# Patient Record
Sex: MALE | Race: WHITE | NOT HISPANIC OR LATINO | Employment: FULL TIME | URBAN - METROPOLITAN AREA
[De-identification: names, ages, dates, MRNs, and addresses within clinical notes are randomized per-mention and may not be internally consistent; named-entity substitution may affect disease eponyms.]

---

## 2017-02-15 ENCOUNTER — HOSPITAL ENCOUNTER (EMERGENCY)
Facility: HOSPITAL | Age: 37
Discharge: HOME | End: 2017-02-15
Admitting: EMERGENCY MEDICINE

## 2017-02-15 VITALS
WEIGHT: 168 LBS | TEMPERATURE: 97.3 F | SYSTOLIC BLOOD PRESSURE: 139 MMHG | OXYGEN SATURATION: 99 % | RESPIRATION RATE: 16 BRPM | DIASTOLIC BLOOD PRESSURE: 80 MMHG | HEART RATE: 66 BPM

## 2017-02-15 DIAGNOSIS — B34.9 ACUTE VIRAL SYNDROME: Primary | ICD-10-CM

## 2017-02-15 PROCEDURE — 99283 EMERGENCY DEPT VISIT LOW MDM: CPT

## 2017-02-15 RX ORDER — FLUTICASONE PROPIONATE 50 MCG
1 SPRAY, SUSPENSION (ML) NASAL DAILY
Qty: 1 BOTTLE | Refills: 0 | Status: SHIPPED | OUTPATIENT
Start: 2017-02-15 | End: 2017-12-27

## 2017-12-27 ENCOUNTER — HOSPITAL ENCOUNTER (EMERGENCY)
Facility: HOSPITAL | Age: 37
Discharge: HOME | End: 2017-12-27
Admitting: EMERGENCY MEDICINE
Payer: COMMERCIAL

## 2017-12-27 VITALS
RESPIRATION RATE: 15 BRPM | TEMPERATURE: 97.4 F | HEART RATE: 93 BPM | SYSTOLIC BLOOD PRESSURE: 135 MMHG | WEIGHT: 168 LBS | OXYGEN SATURATION: 100 % | DIASTOLIC BLOOD PRESSURE: 68 MMHG

## 2017-12-27 DIAGNOSIS — R19.7 DIARRHEA: Primary | ICD-10-CM

## 2017-12-27 LAB
ALBUMIN SERPL BCP-MCNC: 4 G/DL (ref 3.5–5)
ALP SERPL-CCNC: 64 U/L (ref 46–116)
ALT SERPL W P-5'-P-CCNC: 22 U/L (ref 12–78)
ANION GAP BLD CALC-SCNC: 14 MMOL/L (ref 4–13)
ANION GAP SERPL CALCULATED.3IONS-SCNC: 8 MMOL/L (ref 4–13)
AST SERPL W P-5'-P-CCNC: 17 U/L (ref 5–45)
BASOPHILS # BLD AUTO: 0.06 THOUSANDS/ΜL (ref 0–0.1)
BASOPHILS NFR BLD AUTO: 1 % (ref 0–1)
BILIRUB SERPL-MCNC: 0.37 MG/DL (ref 0.2–1)
BUN BLD-MCNC: 10 MG/DL (ref 5–25)
BUN SERPL-MCNC: 10 MG/DL (ref 5–25)
CA-I BLD-SCNC: 1.13 MMOL/L (ref 1.12–1.32)
CALCIUM SERPL-MCNC: 8.7 MG/DL (ref 8.3–10.1)
CHLORIDE BLD-SCNC: 102 MMOL/L (ref 100–108)
CHLORIDE SERPL-SCNC: 104 MMOL/L (ref 100–108)
CO2 SERPL-SCNC: 31 MMOL/L (ref 21–32)
CREAT BLD-MCNC: 0.8 MG/DL (ref 0.6–1.3)
CREAT SERPL-MCNC: 0.77 MG/DL (ref 0.6–1.3)
EOSINOPHIL # BLD AUTO: 0.13 THOUSAND/ΜL (ref 0–0.61)
EOSINOPHIL NFR BLD AUTO: 2 % (ref 0–6)
ERYTHROCYTE [DISTWIDTH] IN BLOOD BY AUTOMATED COUNT: 13.5 % (ref 11.6–15.1)
GFR SERPL CREATININE-BSD FRML MDRD: 115 ML/MIN/1.73SQ M
GFR SERPL CREATININE-BSD FRML MDRD: 117 ML/MIN/1.73SQ M
GLUCOSE SERPL-MCNC: 94 MG/DL (ref 65–140)
GLUCOSE SERPL-MCNC: 96 MG/DL (ref 65–140)
HCT VFR BLD AUTO: 41.3 % (ref 36.5–49.3)
HCT VFR BLD CALC: 41 % (ref 36.5–49.3)
HGB BLD-MCNC: 14.3 G/DL (ref 12–17)
HGB BLDA-MCNC: 13.9 G/DL (ref 12–17)
LIPASE SERPL-CCNC: 116 U/L (ref 73–393)
LYMPHOCYTES # BLD AUTO: 2.73 THOUSANDS/ΜL (ref 0.6–4.47)
LYMPHOCYTES NFR BLD AUTO: 33 % (ref 14–44)
MCH RBC QN AUTO: 31.5 PG (ref 26.8–34.3)
MCHC RBC AUTO-ENTMCNC: 34.6 G/DL (ref 31.4–37.4)
MCV RBC AUTO: 91 FL (ref 82–98)
MONOCYTES # BLD AUTO: 0.6 THOUSAND/ΜL (ref 0.17–1.22)
MONOCYTES NFR BLD AUTO: 7 % (ref 4–12)
NEUTROPHILS # BLD AUTO: 4.88 THOUSANDS/ΜL (ref 1.85–7.62)
NEUTS SEG NFR BLD AUTO: 57 % (ref 43–75)
NRBC BLD AUTO-RTO: 0 /100 WBCS
PCO2 BLD: 31 MMOL/L (ref 21–32)
PLATELET # BLD AUTO: 284 THOUSANDS/UL (ref 149–390)
PMV BLD AUTO: 9.5 FL (ref 8.9–12.7)
POTASSIUM BLD-SCNC: 3.7 MMOL/L (ref 3.5–5.3)
POTASSIUM SERPL-SCNC: 3.7 MMOL/L (ref 3.5–5.3)
PROT SERPL-MCNC: 7.2 G/DL (ref 6.4–8.2)
RBC # BLD AUTO: 4.54 MILLION/UL (ref 3.88–5.62)
SODIUM BLD-SCNC: 142 MMOL/L (ref 136–145)
SODIUM SERPL-SCNC: 143 MMOL/L (ref 136–145)
SPECIMEN SOURCE: ABNORMAL
WBC # BLD AUTO: 8.4 THOUSAND/UL (ref 4.31–10.16)

## 2017-12-27 PROCEDURE — 85014 HEMATOCRIT: CPT

## 2017-12-27 PROCEDURE — 36415 COLL VENOUS BLD VENIPUNCTURE: CPT | Performed by: PHYSICIAN ASSISTANT

## 2017-12-27 PROCEDURE — 80047 BASIC METABLC PNL IONIZED CA: CPT

## 2017-12-27 PROCEDURE — 83690 ASSAY OF LIPASE: CPT | Performed by: PHYSICIAN ASSISTANT

## 2017-12-27 PROCEDURE — 99284 EMERGENCY DEPT VISIT MOD MDM: CPT

## 2017-12-27 PROCEDURE — 85025 COMPLETE CBC W/AUTO DIFF WBC: CPT | Performed by: PHYSICIAN ASSISTANT

## 2017-12-27 PROCEDURE — 96360 HYDRATION IV INFUSION INIT: CPT

## 2017-12-27 PROCEDURE — 80053 COMPREHEN METABOLIC PANEL: CPT | Performed by: PHYSICIAN ASSISTANT

## 2017-12-27 PROCEDURE — 86617 LYME DISEASE ANTIBODY: CPT | Performed by: PHYSICIAN ASSISTANT

## 2017-12-27 RX ADMIN — SODIUM CHLORIDE 1000 ML: 0.9 INJECTION, SOLUTION INTRAVENOUS at 20:01

## 2017-12-28 NOTE — ED PROVIDER NOTES
History  Chief Complaint   Patient presents with    Diarrhea     patient reports recent gastroenteritis for approx 2-3 weeks  reports improvement for one week  reports diarrhea, nausea, and abdominal cramping started again last night  71-year-old male presents to the emergency department for persistent diarrhea symptoms  Patient has been seen and evaluated at [facility] x2 for similar symptoms  Patient denies any significant workup while at [facility]  Patient in no acute distress non toxic  At this time will line lab will try to attempt a stool sample while in the department  Patient agreeable  History provided by:  Patient   used: No    GI Problem   Location:  Abd discomfort   Quality:  Bloating recurrent diarrhea  Severity:  Moderate  Onset quality:  Gradual  Timing:  Intermittent  Progression:  Waxing and waning  Chronicity:  New  Relieved by:  Gatorate helps  Associated symptoms: abdominal pain, diarrhea, fatigue, headaches and nausea    Associated symptoms: no chest pain, no congestion, no cough, no ear pain, no fever, no loss of consciousness, no myalgias, no rash, no rhinorrhea, no shortness of breath, no sore throat and no vomiting    Associated symptoms comment:  ABD discomfort  Abdominal pain:     Location:  Generalized    Quality: aching and bloating    Diarrhea:     Quality:  Watery, copious and malodorous    Severity:  Moderate    Duration:  3 weeks    Timing:  Intermittent    Progression:  Partially resolved      None       History reviewed  No pertinent past medical history  History reviewed  No pertinent surgical history  History reviewed  No pertinent family history  I have reviewed and agree with the history as documented      Social History   Substance Use Topics    Smoking status: Current Every Day Smoker    Smokeless tobacco: Never Used    Alcohol use Yes      Comment: occassionally        Review of Systems   Constitutional: Positive for fatigue  Negative for fever  HENT: Negative for congestion, ear pain, rhinorrhea and sore throat  Respiratory: Negative for cough and shortness of breath  Cardiovascular: Negative for chest pain  Gastrointestinal: Positive for abdominal pain, diarrhea and nausea  Negative for vomiting  Musculoskeletal: Negative for myalgias  Skin: Negative for rash  Neurological: Positive for headaches  Negative for loss of consciousness  Physical Exam  ED Triage Vitals [12/27/17 1925]   Temperature Pulse Respirations Blood Pressure SpO2   (!) 97 4 °F (36 3 °C) 93 15 135/68 100 %      Temp Source Heart Rate Source Patient Position - Orthostatic VS BP Location FiO2 (%)   Oral -- Sitting Right arm --      Pain Score       --           Orthostatic Vital Signs  Vitals:    12/27/17 1925   BP: 135/68   Pulse: 93   Patient Position - Orthostatic VS: Sitting       Physical Exam   Constitutional: He is oriented to person, place, and time  He appears well-developed and well-nourished  No distress  HENT:   Head: Normocephalic  Right Ear: External ear normal    Left Ear: External ear normal    Nose: Nose normal    Mouth/Throat: Oropharynx is clear and moist  No oropharyngeal exudate  Eyes: Conjunctivae are normal    Neck: Neck supple  No JVD present  Cardiovascular: Normal rate and regular rhythm  Pulmonary/Chest: Effort normal    Abdominal: Soft  Bowel sounds are normal    Genitourinary:   Genitourinary Comments: No complaints deferred   Musculoskeletal: Normal range of motion  He exhibits no edema or deformity  Lymphadenopathy:     He has no cervical adenopathy  Neurological: He is alert and oriented to person, place, and time  Skin: Skin is warm and dry  Capillary refill takes less than 2 seconds  He is not diaphoretic         ED Medications  Medications   sodium chloride 0 9 % bolus 1,000 mL (0 mL Intravenous Stopped 12/27/17 2056)       Diagnostic Studies  Results Reviewed     Procedure Component Value Units Date/Time    Lyme disease, western blot [[id number]] Collected:  12/27/17 2114    Lab Status: In process Specimen:  Blood from Arm, Left Updated:  12/27/17 2117    Comprehensive metabolic panel [[id number]] Collected:  12/27/17 2000    Lab Status:  Final result Specimen:  Blood from Arm, Left Updated:  12/27/17 2021     Sodium 143 mmol/L      Potassium 3 7 mmol/L      Chloride 104 mmol/L      CO2 31 mmol/L      Anion Gap 8 mmol/L      BUN 10 mg/dL      Creatinine 0 77 mg/dL      Glucose 96 mg/dL      Calcium 8 7 mg/dL      AST 17 U/L      ALT 22 U/L      Alkaline Phosphatase 64 U/L      Total Protein 7 2 g/dL      Albumin 4 0 g/dL      Total Bilirubin 0 37 mg/dL      eGFR 117 ml/min/1 73sq m     Narrative:         National Kidney Disease Education Program recommendations are as follows:  GFR calculation is accurate only with a steady state creatinine  Chronic Kidney disease less than 60 ml/min/1 73 sq  meters  Kidney failure less than 15 ml/min/1 73 sq  meters      Lipase [[id number]]  (Normal) Collected:  12/27/17 2000    Lab Status:  Final result Specimen:  Blood from Arm, Left Updated:  12/27/17 2021     Lipase 116 u/L     POCT Chem 8+ [[id number]]  (Abnormal) Collected:  12/27/17 2006    Lab Status:  Final result Updated:  12/27/17 2011     SODIUM, I-STAT 142 mmol/l      Potassium, i-STAT 3 7 mmol/L      Chloride, istat 102 mmol/L      CO2, i-STAT 31 mmol/L      Anion Gap, Istat 14 (H) mmol/L      Calcium, Ionized i-STAT 1 13 mmol/L      BUN, I-STAT 10 mg/dl      Creatinine, i-STAT 0 8 mg/dl      eGFR 115 ml/min/1 73sq m      Glucose, i-STAT 94 mg/dl      Hct, i-STAT 41 %      Hgb, i-STAT 13 9 g/dl      Specimen Type VENOUS    CBC and differential [[id number]]  (Normal) Collected:  12/27/17 2000    Lab Status:  Final result Specimen:  Blood from Arm, Left Updated:  12/27/17 2007     WBC 8 40 Thousand/uL      RBC 4 54 Million/uL      Hemoglobin 14 3 g/dL      Hematocrit 41 3 %      MCV 91 fL      MCH 31 5 pg MCHC 34 6 g/dL      RDW 13 5 %      MPV 9 5 fL      Platelets 472 Thousands/uL      nRBC 0 /100 WBCs      Neutrophils Relative 57 %      Lymphocytes Relative 33 %      Monocytes Relative 7 %      Eosinophils Relative 2 %      Basophils Relative 1 %      Neutrophils Absolute 4 88 Thousands/µL      Lymphocytes Absolute 2 73 Thousands/µL      Monocytes Absolute 0 60 Thousand/µL      Eosinophils Absolute 0 13 Thousand/µL      Basophils Absolute 0 06 Thousands/µL     Stool Enteric Bacterial Panel by PCR [[id number]]     Lab Status:  No result Specimen:  Stool from Per Rectum     Clostridium difficile toxin by PCR [[id number]]     Lab Status:  No result Specimen:  Stool from Per Rectum                  No orders to display              Procedures  Procedures       Phone Contacts  ED Phone Contact    ED Course  ED Course                                MDM  Number of Diagnoses or Management Options  Diarrhea:   Diagnosis management comments: Labs reviewed  Patient unable to give a stool sample in the department  Patient nontoxic  Patient felt stable for discharge to home with prescriptions for outpatient stool sample evaluation  Educated patient of diagnosis and home management  Encourage patient to hydrate well  Encourage patient to obtain his stool laboratory test   Educated patient on persistent or worsening signs symptoms and to follow up with primary care and/or return to the emergency department  Patient admits understanding and agreement         Amount and/or Complexity of Data Reviewed  Clinical lab tests: ordered and reviewed      CritCare Time    Disposition  Final diagnoses:   Diarrhea     Time reflects when diagnosis was documented in both MDM as applicable and the Disposition within this note     Time User Action Codes Description Comment    12/27/2017  9:24 PM Kimberly Aranda Add [R19 7] Diarrhea       ED Disposition     ED Disposition Condition Comment    Discharge  [facility] discharge to home/self care     Condition at discharge: Stable        Follow-up Information    None       There are no discharge medications for this patient  Outpatient Discharge Orders  Stool Enteric Bacterial Panel by PCR   Standing Status: Future  Standing Exp  Date: 12/27/18     Aeromonas/Pleisiomonas Stool Culture   Standing Status: Future  Standing Exp  Date: 12/27/18     Clostridium difficile toxin by PCR   Standing Status: Future  Standing Exp   Date: 12/27/18         ED Provider  Electronically Signed by           Selvin Curtis PA-C  12/28/17 9244

## 2017-12-28 NOTE — DISCHARGE INSTRUCTIONS
Acute Diarrhea   WHAT YOU NEED TO KNOW:   Acute diarrhea starts quickly and lasts a short time, usually 1 to 3 days  It can last up to 2 weeks  You may not be able to control your diarrhea  Acute diarrhea usually stops on its own  DISCHARGE INSTRUCTIONS:   Return to the emergency department if:   · You feel confused  · Your heartbeat is faster than normal      · Your eyes look deeply sunken, or you have no tears when you cry  · You urinate less than usual, or your urine is dark yellow  · You have blood or mucus in your stools  · You have severe abdominal pain  · You are unable to drink any liquids  Contact your healthcare provider if:   · Your symptoms do not get better with treatment  · You have a fever higher than 101 3°F (38 5°C)  · You have trouble eating and drinking because you are vomiting  · You are thirsty or have a dry mouth  · Your diarrhea does not get better in 7 days  · You have questions or concerns about your condition or care  Follow up with your healthcare provider as directed:  Write down your questions so you remember to ask them during your visits  Medicines:  · Diarrhea medicine  is an over-the-counter medicine that helps slow or stop your diarrhea  If you take other medicines, talk to your healthcare provider before you take diarrhea medicine  · Antibiotics  may be given to help treat an infection caused by bacteria  · Antiparasitics  may be given to treat an infection caused by parasites  · Take your medicine as directed  Contact your healthcare provider if you think your medicine is not helping or if you have side effects  Tell him of her if you are allergic to any medicine  Keep a list of the medicines, vitamins, and herbs you take  Include the amounts, and when and why you take them  Bring the list or the pill bottles to follow-up visits  Carry your medicine list with you in case of an emergency    Self-care:   · Drink liquids as directed  Liquids will help prevent dehydration caused by diarrhea  Ask your healthcare provider how much liquid to drink each day and which liquids are best for you  You may need to drink an oral rehydration solution (ORS)  An ORS has the right amounts of water, salts, and sugar you need to replace body fluids  You can buy an ORS at most grocery stores and pharmacies  · Eat foods that are easy to digest   Examples include rice, lentils, cereal, bananas, potatoes, and bread  It also includes some fruits (bananas, melon), well-cooked vegetables, and lean meats  Avoid foods high in fiber, fat, and sugar  Also avoid caffeine, alcohol, dairy, and red meat until your diarrhea is gone  Prevent acute diarrhea:   · Wash your hands often  Use soap and water  Wash your hands before you eat or prepare food  Also wash your hands after you use the bathroom  Use an alcohol-based hand gel when soap and water are not available  · Keep bathroom surfaces clean  This helps prevent the spread of germs that cause acute diarrhea  · Wash fruits and vegetables well before you eat them  This can help remove germs that cause diarrhea  If possible, remove the skin from fruits and vegetables, or cook them well before you eat them  · Cook meat as directed  ¨ Cook ground meat  to 160°F      ¨ Cook ground poultry, whole poultry, or cuts of poultry  to at least 165°F  Remove the meat from heat  Let it stand for 3 minutes before you eat it  ¨ Cook whole cuts of meat other than poultry  to at least 145°F  Remove the meat from heat  Let it stand for 3 minutes before you eat it  · Wash dishes that have touched raw meat with hot water and soap  This includes cutting boards, utensils, dishes, and serving containers  · Place raw or cooked meat in the refrigerator as soon as possible  Bacteria can grow in meat that is left at room temperature too long  · Do not eat raw or undercooked oysters, clams, or mussels  These foods may be contaminated and cause infection  · Drink filtered or treated water only when you travel  Do not put ice in your drinks  Drink bottled water whenever possible  © 2017 [employer] Information is for End User's use only and may not be sold, redistributed or otherwise used for commercial purposes  All illustrations and images included in CareNotes® are the copyrighted property of A [employer]  or Stanley Donohue  The above information is an  only  It is not intended as medical advice for individual conditions or treatments  Talk to your doctor, nurse or pharmacist before following any medical regimen to see if it is safe and effective for you

## 2017-12-29 LAB
B BURGDOR IGG PATRN SER IB-IMP: NEGATIVE
B BURGDOR IGM PATRN SER IB-IMP: NEGATIVE
B BURGDOR18KD IGG SER QL IB: ABNORMAL
B BURGDOR23KD IGG SER QL IB: ABNORMAL
B BURGDOR23KD IGM SER QL IB: PRESENT
B BURGDOR28KD IGG SER QL IB: ABNORMAL
B BURGDOR30KD IGG SER QL IB: ABNORMAL
B BURGDOR39KD IGG SER QL IB: ABNORMAL
B BURGDOR39KD IGM SER QL IB: ABNORMAL
B BURGDOR41KD IGG SER QL IB: ABNORMAL
B BURGDOR41KD IGM SER QL IB: ABNORMAL
B BURGDOR45KD IGG SER QL IB: ABNORMAL
B BURGDOR58KD IGG SER QL IB: ABNORMAL
B BURGDOR66KD IGG SER QL IB: ABNORMAL
B BURGDOR93KD IGG SER QL IB: ABNORMAL

## 2018-02-26 ENCOUNTER — HOSPITAL ENCOUNTER (EMERGENCY)
Facility: HOSPITAL | Age: 38
Discharge: HOME | End: 2018-02-26
Attending: EMERGENCY MEDICINE | Admitting: EMERGENCY MEDICINE
Payer: COMMERCIAL

## 2018-02-26 VITALS
TEMPERATURE: 97.8 F | OXYGEN SATURATION: 99 % | RESPIRATION RATE: 16 BRPM | HEART RATE: 61 BPM | SYSTOLIC BLOOD PRESSURE: 122 MMHG | DIASTOLIC BLOOD PRESSURE: 70 MMHG | WEIGHT: 171 LBS

## 2018-02-26 DIAGNOSIS — E86.0 DEHYDRATION: ICD-10-CM

## 2018-02-26 DIAGNOSIS — R10.9 ABDOMINAL PAIN: ICD-10-CM

## 2018-02-26 DIAGNOSIS — R19.7 ACUTE DIARRHEA: Primary | ICD-10-CM

## 2018-02-26 LAB
ALBUMIN SERPL BCP-MCNC: 4.6 G/DL (ref 3.5–5)
ALP SERPL-CCNC: 73 U/L (ref 46–116)
ALT SERPL W P-5'-P-CCNC: 25 U/L (ref 12–78)
ANION GAP SERPL CALCULATED.3IONS-SCNC: 8 MMOL/L (ref 4–13)
AST SERPL W P-5'-P-CCNC: 27 U/L (ref 5–45)
BACTERIA UR QL AUTO: ABNORMAL /HPF
BASOPHILS # BLD AUTO: 0.05 THOUSANDS/ΜL (ref 0–0.1)
BASOPHILS NFR BLD AUTO: 1 % (ref 0–1)
BILIRUB SERPL-MCNC: 0.63 MG/DL (ref 0.2–1)
BILIRUB UR QL STRIP: ABNORMAL
BUN SERPL-MCNC: 13 MG/DL (ref 5–25)
CALCIUM SERPL-MCNC: 9.4 MG/DL (ref 8.3–10.1)
CHLORIDE SERPL-SCNC: 104 MMOL/L (ref 100–108)
CLARITY UR: CLEAR
CO2 SERPL-SCNC: 28 MMOL/L (ref 21–32)
COLOR UR: YELLOW
CREAT SERPL-MCNC: 0.68 MG/DL (ref 0.6–1.3)
EOSINOPHIL # BLD AUTO: 0.08 THOUSAND/ΜL (ref 0–0.61)
EOSINOPHIL NFR BLD AUTO: 1 % (ref 0–6)
ERYTHROCYTE [DISTWIDTH] IN BLOOD BY AUTOMATED COUNT: 13.5 % (ref 11.6–15.1)
GFR SERPL CREATININE-BSD FRML MDRD: 123 ML/MIN/1.73SQ M
GLUCOSE SERPL-MCNC: 94 MG/DL (ref 65–140)
GLUCOSE UR STRIP-MCNC: NEGATIVE MG/DL
HCT VFR BLD AUTO: 43.3 % (ref 36.5–49.3)
HGB BLD-MCNC: 15 G/DL (ref 12–17)
HGB UR QL STRIP.AUTO: ABNORMAL
KETONES UR STRIP-MCNC: NEGATIVE MG/DL
LEUKOCYTE ESTERASE UR QL STRIP: NEGATIVE
LIPASE SERPL-CCNC: 71 U/L (ref 73–393)
LYMPHOCYTES # BLD AUTO: 2.17 THOUSANDS/ΜL (ref 0.6–4.47)
LYMPHOCYTES NFR BLD AUTO: 25 % (ref 14–44)
MCH RBC QN AUTO: 30.4 PG (ref 26.8–34.3)
MCHC RBC AUTO-ENTMCNC: 34.6 G/DL (ref 31.4–37.4)
MCV RBC AUTO: 88 FL (ref 82–98)
MONOCYTES # BLD AUTO: 0.83 THOUSAND/ΜL (ref 0.17–1.22)
MONOCYTES NFR BLD AUTO: 9 % (ref 4–12)
MUCOUS THREADS UR QL AUTO: ABNORMAL
NEUTROPHILS # BLD AUTO: 5.69 THOUSANDS/ΜL (ref 1.85–7.62)
NEUTS SEG NFR BLD AUTO: 64 % (ref 43–75)
NITRITE UR QL STRIP: NEGATIVE
NON-SQ EPI CELLS URNS QL MICRO: ABNORMAL /HPF
PH UR STRIP.AUTO: 6 [PH] (ref 4.5–8)
PLATELET # BLD AUTO: 317 THOUSANDS/UL (ref 149–390)
PMV BLD AUTO: 9.1 FL (ref 8.9–12.7)
POTASSIUM SERPL-SCNC: 4.5 MMOL/L (ref 3.5–5.3)
PROT SERPL-MCNC: 8.2 G/DL (ref 6.4–8.2)
PROT UR STRIP-MCNC: ABNORMAL MG/DL
RBC # BLD AUTO: 4.93 MILLION/UL (ref 3.88–5.62)
RBC #/AREA URNS AUTO: ABNORMAL /HPF
SODIUM SERPL-SCNC: 140 MMOL/L (ref 136–145)
SP GR UR STRIP.AUTO: 1.02 (ref 1–1.03)
UROBILINOGEN UR QL STRIP.AUTO: 0.2 E.U./DL
WBC # BLD AUTO: 8.82 THOUSAND/UL (ref 4.31–10.16)
WBC #/AREA URNS AUTO: ABNORMAL /HPF

## 2018-02-26 PROCEDURE — 99284 EMERGENCY DEPT VISIT MOD MDM: CPT

## 2018-02-26 PROCEDURE — 36415 COLL VENOUS BLD VENIPUNCTURE: CPT

## 2018-02-26 PROCEDURE — 83690 ASSAY OF LIPASE: CPT | Performed by: EMERGENCY MEDICINE

## 2018-02-26 PROCEDURE — 80053 COMPREHEN METABOLIC PANEL: CPT | Performed by: EMERGENCY MEDICINE

## 2018-02-26 PROCEDURE — 81002 URINALYSIS NONAUTO W/O SCOPE: CPT | Performed by: EMERGENCY MEDICINE

## 2018-02-26 PROCEDURE — 85025 COMPLETE CBC W/AUTO DIFF WBC: CPT | Performed by: EMERGENCY MEDICINE

## 2018-02-26 PROCEDURE — 81001 URINALYSIS AUTO W/SCOPE: CPT

## 2018-02-26 RX ORDER — SODIUM CHLORIDE 9 MG/ML
1000 INJECTION, SOLUTION INTRAVENOUS ONCE
Status: COMPLETED | OUTPATIENT
Start: 2018-02-26 | End: 2018-02-26

## 2018-02-26 RX ORDER — DICYCLOMINE HCL 20 MG
20 TABLET ORAL 2 TIMES DAILY
Qty: 20 TABLET | Refills: 0 | Status: SHIPPED | OUTPATIENT
Start: 2018-02-26 | End: 2018-03-10

## 2018-02-26 RX ADMIN — SODIUM CHLORIDE 1000 ML/HR: 0.9 INJECTION, SOLUTION INTRAVENOUS at 12:10

## 2018-02-26 NOTE — ED PROVIDER NOTES
History  Chief Complaint   Patient presents with    Diarrhea     Pt reports a dozen episodes of diarrhea over night  pt states he has chills but denies abdominal pain  Pt also complains of generalized weakness  Pt deneis fevers but is positive for nausea  Pt denies cp/sob  History provided by:  Patient  Diarrhea   Quality:  Semi-solid  Severity:  Unable to specify  Onset quality:  Gradual  Duration:  1 day  Timing:  Constant  Progression:  Unchanged  Relieved by:  Nothing  Worsened by:  Nothing  Ineffective treatments:  None tried  Associated symptoms: abdominal pain and chills    Associated symptoms: no arthralgias, no recent cough, no diaphoresis, no fever, no headaches, no myalgias, no URI and no vomiting    Abdominal pain:     Location:  Generalized    Quality: aching      Severity:  Mild    Onset quality:  Gradual    Duration:  1 day    Timing:  Constant  Risk factors: no recent antibiotic use and no travel to endemic areas        None       Past Medical History:   Diagnosis Date    No known problems        History reviewed  No pertinent surgical history  History reviewed  No pertinent family history  I have reviewed and agree with the history as documented  Social History   Substance Use Topics    Smoking status: Current Every Day Smoker    Smokeless tobacco: Never Used    Alcohol use Yes      Comment: occassionally        Review of Systems   Constitutional: Positive for chills  Negative for activity change, appetite change, diaphoresis, fatigue and fever  HENT: Negative for congestion, dental problem, ear pain, rhinorrhea and sore throat  Eyes: Negative for pain and redness  Respiratory: Negative for chest tightness, shortness of breath and wheezing  Cardiovascular: Negative for chest pain and palpitations  Gastrointestinal: Positive for abdominal pain and diarrhea  Negative for blood in stool, constipation, nausea and vomiting     Endocrine: Negative for cold intolerance and heat intolerance  Genitourinary: Negative for dysuria, frequency and hematuria  Musculoskeletal: Negative for arthralgias and myalgias  Skin: Negative for color change, pallor and rash  Neurological: Negative for weakness, numbness and headaches  Hematological: Does not bruise/bleed easily  Psychiatric/Behavioral: Negative for agitation, hallucinations and suicidal ideas  Physical Exam  ED Triage Vitals   Temperature Pulse Respirations Blood Pressure SpO2   02/26/18 1053 02/26/18 1052 02/26/18 1052 02/26/18 1052 02/26/18 1052   98 °F (36 7 °C) 80 16 118/70 98 %      Temp Source Heart Rate Source Patient Position - Orthostatic VS BP Location FiO2 (%)   02/26/18 1053 02/26/18 1052 02/26/18 1052 02/26/18 1052 --   Oral Monitor Sitting Right arm       Pain Score       02/26/18 1052       No Pain           Orthostatic Vital Signs  Vitals:    02/26/18 1052   BP: 118/70   Pulse: 80   Patient Position - Orthostatic VS: Sitting       Physical Exam   Constitutional: He is oriented to person, place, and time  He appears well-developed and well-nourished  HENT:   Mouth/Throat: No oropharyngeal exudate  TMs normal bilaterally no pharyngeal erythema no rhinorrhea nontender palpation of sinuses, normal looking turbinates   Eyes: Conjunctivae and EOM are normal    Neck: Normal range of motion  Neck supple  No meningeal signs   Cardiovascular: Normal rate, regular rhythm, normal heart sounds and intact distal pulses  Pulmonary/Chest: Effort normal and breath sounds normal  No respiratory distress  He has no wheezes  He has no rales  He exhibits no tenderness  Abdominal: Soft  Bowel sounds are normal  He exhibits no distension and no mass  There is no tenderness  No hernia  No cvat   Musculoskeletal: Normal range of motion  He exhibits no edema  Lymphadenopathy:     He has no cervical adenopathy  Neurological: He is alert and oriented to person, place, and time  No cranial nerve deficit     Skin: No rash noted  No erythema  No edema   Psychiatric: He has a normal mood and affect  His behavior is normal    Nursing note and vitals reviewed  ED Medications  Medications   sodium chloride 0 9 % infusion (1,000 mL/hr Intravenous New Bag 2/26/18 1210)       Diagnostic Studies  Results Reviewed     Procedure Component Value Units Date/Time    Urine Microscopic [[id number]]  (Abnormal) Collected:  02/26/18 1147    Lab Status:  Final result Specimen:  Urine from Urine, Clean Catch Updated:  02/26/18 1224     RBC, UA 2-4 (A) /hpf      WBC, UA None Seen /hpf      Epithelial Cells Occasional /hpf      Bacteria, UA None Seen /hpf      MUCOUS THREADS Occasional    Comprehensive metabolic panel [[id number]] Collected:  02/26/18 1143    Lab Status:  Final result Specimen:  Blood from Arm, Right Updated:  02/26/18 1203     Sodium 140 mmol/L      Potassium 4 5 mmol/L      Chloride 104 mmol/L      CO2 28 mmol/L      Anion Gap 8 mmol/L      BUN 13 mg/dL      Creatinine 0 68 mg/dL      Glucose 94 mg/dL      Calcium 9 4 mg/dL      AST 27 U/L      ALT 25 U/L      Alkaline Phosphatase 73 U/L      Total Protein 8 2 g/dL      Albumin 4 6 g/dL      Total Bilirubin 0 63 mg/dL      eGFR 123 ml/min/1 73sq m     Narrative:         National Kidney Disease Education Program recommendations are as follows:  GFR calculation is accurate only with a steady state creatinine  Chronic Kidney disease less than 60 ml/min/1 73 sq  meters  Kidney failure less than 15 ml/min/1 73 sq  meters      Lipase [[id number]]  (Abnormal) Collected:  02/26/18 1143    Lab Status:  Final result Specimen:  Blood from Arm, Right Updated:  02/26/18 1203     Lipase 71 (L) u/L     CBC and differential [[id number]]  (Normal) Collected:  02/26/18 1143    Lab Status:  Final result Specimen:  Blood from Arm, Right Updated:  02/26/18 1153     WBC 8 82 Thousand/uL      RBC 4 93 Million/uL      Hemoglobin 15 0 g/dL      Hematocrit 43 3 %      MCV 88 fL      MCH 30 4 pg      MCHC 34 6 g/dL      RDW 13 5 %      MPV 9 1 fL      Platelets 013 Thousands/uL      Neutrophils Relative 64 %      Lymphocytes Relative 25 %      Monocytes Relative 9 %      Eosinophils Relative 1 %      Basophils Relative 1 %      Neutrophils Absolute 5 69 Thousands/µL      Lymphocytes Absolute 2 17 Thousands/µL      Monocytes Absolute 0 83 Thousand/µL      Eosinophils Absolute 0 08 Thousand/µL      Basophils Absolute 0 05 Thousands/µL     POCT urinalysis dipstick [[id number]]  (Abnormal) Resulted:  02/26/18 1147    Lab Status:  Final result Updated:  02/26/18 1147    ED Urine Macroscopic [[id number]]  (Abnormal) Collected:  02/26/18 1147    Lab Status:  Final result Specimen:  Urine Updated:  02/26/18 1146     Color, UA Yellow     Clarity, UA Clear     pH, UA 6 0     Leukocytes, UA Negative     Nitrite, UA Negative     Protein, UA Trace (A) mg/dl      Glucose, UA Negative mg/dl      Ketones, UA Negative mg/dl      Urobilinogen, UA 0 2 E U /dl      Bilirubin, UA Interference- unable to analyze (A)     Blood, UA Trace (A)     Specific [place]ion, UA 1 020    Narrative:       CLINITEK RESULT                 No orders to display              Procedures  Procedures       Phone Contacts  ED Phone Contact    ED Course  ED Course as of Feb 26 1227   Mon Feb 26, 2018   1219 Work up reviewed and benign  Will dc to home with symptomatic tx                                  MDM  Number of Diagnoses or Management Options  Abdominal pain:   Acute diarrhea:   Dehydration:   Diagnosis management comments: abd pain,diarrhea, benign exam-will check labs, ivfs, tx symptoms    CritCare Time    Disposition  Final diagnoses:   Acute diarrhea   Abdominal pain   Dehydration     Time reflects when diagnosis was documented in both MDM as applicable and the Disposition within this note     Time User Action Codes Description Comment    2/26/2018 12:22 PM Savi Ross Add [R19 7] Acute diarrhea     2/26/2018 12:22 PM Miranda Fu Add [R10 9] Abdominal pain 2/26/2018 12:22 PM Vikash Cagle Add [E86 0] Dehydration       ED Disposition     ED Disposition Condition Comment    Discharge  Hermelindo Olson discharge to home/self care  Condition at discharge: Good        Follow-up Information     Follow up With Specialties Details Why Contact Info    [facility]  Schedule an appointment as soon as possible for a visit in 2 days  [phone]          Patient's Medications   Discharge Prescriptions    DICYCLOMINE (BENTYL) 20 MG TABLET    Take 1 tablet (20 mg total) by mouth 2 (two) times a day for 7 days       Start Date: 2/26/2018 End Date: 3/5/2018       Order Dose: 20 mg       Quantity: 20 tablet    Refills: 0     No discharge procedures on file      ED Provider  Electronically Signed by           Jenny Escalante MD  02/26/18 4660

## 2018-02-26 NOTE — DISCHARGE INSTRUCTIONS
Acute Abdominal Pain   WHAT YOU NEED TO KNOW:   The cause of your abdominal pain may not be found  If a cause is found, treatment will depend on what the cause is  DISCHARGE INSTRUCTIONS:   Return to the emergency department if:   · You vomit blood or cannot stop vomiting  · You have blood in your bowel movement or it looks like tar  · You have bleeding from your rectum  · Your abdomen is larger than usual, more painful, and hard  · You have severe pain in your abdomen  · You stop passing gas and having bowel movements  · You feel weak, dizzy, or faint  Contact your healthcare provider if:   · You have a fever  · You have new signs and symptoms  · Your symptoms do not get better with treatment  · You have questions or concerns about your condition or care  Medicines  may be given to decrease pain, treat an infection, and manage your symptoms  Take your medicine as directed  Call your healthcare provider if you think your medicine is not helping or if you have side effects  Tell him if you are allergic to any medicine  Keep a list of the medicines, vitamins, and herbs you take  Include the amounts, and when and why you take them  Bring the list or the pill bottles to follow-up visits  Carry your medicine list with you in case of an emergency  Manage your symptoms:   · Apply heat  on your abdomen for 20 to 30 minutes every 2 hours for as many days as directed  Heat helps decrease pain and muscle spasms  · Manage your stress  Stress may cause abdominal pain  Your healthcare provider may recommend relaxation techniques and deep breathing exercises to help decrease your stress  Your healthcare provider may recommend you talk to someone about your stress or anxiety, such as a counselor or a trusted friend  Get plenty of sleep and exercise regularly  · Limit or do not drink alcohol  Alcohol can make your abdominal pain worse   Ask your healthcare provider if it is safe for you to drink alcohol  Also ask how much is safe for you to drink  · Do not smoke  Nicotine and other chemicals in cigarettes can damage your esophagus and stomach  Ask your healthcare provider for information if you currently smoke and need help to quit  E-cigarettes or smokeless tobacco still contain nicotine  Talk to your healthcare provider before you use these products  Make changes to the food you eat as directed:  Do not eat foods that cause abdominal pain or other symptoms  Eat small meals more often  · Eat more high-fiber foods if you are constipated  High-fiber foods include fruits, vegetables, whole-grain foods, and legumes  · Do not eat foods that cause gas if you have bloating  Examples include broccoli, cabbage, and cauliflower  Do not drink soda or carbonated drinks, because these may also cause gas  · Do not eat foods or drinks that contain sorbitol or fructose if you have diarrhea and bloating  Some examples are fruit juices, candy, jelly, and sugar-free gum  · Do not eat high-fat foods, such as fried foods, cheeseburgers, hot dogs, and desserts  · Limit or do not drink caffeine  Caffeine may make symptoms, such as heart burn or nausea, worse  · Drink plenty of liquids to prevent dehydration from diarrhea or vomiting  Ask your healthcare provider how much liquid to drink each day and which liquids are best for you  Follow up with your healthcare provider as directed:  Write down your questions so you remember to ask them during your visits  © 2017 [employer] Information is for End User's use only and may not be sold, redistributed or otherwise used for commercial purposes  All illustrations and images included in CareNotes® are the copyrighted property of [employer]  or Stanley Donohue  The above information is an  only  It is not intended as medical advice for individual conditions or treatments   Talk to your doctor, nurse or pharmacist before following any medical regimen to see if it is safe and effective for you  Acute Diarrhea   AMBULATORY CARE:   Acute diarrhea  starts quickly and lasts a short time, usually 1 to 3 days  It can last up to 2 weeks  Signs and symptoms that may happen with diarrhea:  You may have 3 or more episodes of diarrhea  It may be hard to control your diarrhea  You may also have any of the following:  · Fever and chills    · Headache or abdominal pain    · Nausea and vomiting    · Symptoms of dehydration such as thirst, decreased urination, dry skin, sunken eyes, or fast, pounding heartbeat  Seek care immediately if:   · You feel confused  · Your heartbeat is faster than normal      · Your eyes look deeply sunken, or you have no tears when you cry  · You urinate less than usual, or your urine is dark yellow  · You have blood or mucus in your stools  · You have severe abdominal pain  · You are unable to drink any liquids  Contact your healthcare provider if:  · Your symptoms do not get better with treatment  · You have a fever higher than 101 3°F (38 5°C)  · You have trouble eating and drinking because you are vomiting  · You are thirsty or have a dry mouth  · Your diarrhea does not get better in 7 days  · You have questions or concerns about your condition or care  Treatment for acute diarrhea  may include medicines to slow or stop your diarrhea  You may also need medicine to treat an infection  Self-care:   · Drink liquids as directed  Liquids will help prevent dehydration caused by diarrhea  Ask your healthcare provider how much liquid to drink each day and which liquids are best for you  You may need to drink an oral rehydration solution (ORS)  An ORS has the right amounts of water, salts, and sugar you need to replace body fluids  You can buy an ORS at most grocery stores and pharmacies       · Eat foods that are easy to digest   Examples include rice, lentils, cereal, bananas, potatoes, and bread  It also includes some fruits (bananas, melon), well-cooked vegetables, and lean meats  Avoid foods high in fiber, fat, and sugar  Also avoid caffeine, alcohol, dairy, and red meat until your diarrhea is gone  Prevent diarrhea:   · Wash your hands often  Use soap and water  Wash your hands before you eat or prepare food  Also wash your hands after you use the bathroom  Use an alcohol-based hand gel when soap and water are not available  · Keep bathroom surfaces clean  This helps prevent the spread of germs that cause acute diarrhea  · Wash fruits and vegetables well before you eat them  This can help remove germs that cause diarrhea  If possible, remove the skin from fruits and vegetables, or cook them well before you eat them  · Cook meat as directed  ¨ Cook ground meat  to 160°F      ¨ Cook ground poultry, whole poultry, or cuts of poultry  to at least 165°F  Remove the meat from heat  Let it stand for 3 minutes before you eat it  ¨ Cook whole cuts of meat other than poultry  to at least 145°F  Remove the meat from heat  Let it stand for 3 minutes before you eat it  · Wash dishes that have touched raw meat with hot water and soap  This includes cutting boards, utensils, dishes, and serving containers  · Place raw or cooked meat in the refrigerator as soon as possible  Bacteria can grow in meat that is left at room temperature too long  · Do not eat raw or undercooked oysters, clams, or mussels  These foods may be contaminated and cause infection  · Drink filtered or treated water only when you travel  Do not put ice in your drinks  Drink bottled water whenever possible  Follow up with your healthcare provider as directed:  Write down your questions so you remember to ask them during your visits     © 2017 [employer] Information is for End User's use only and may not be sold, redistributed or otherwise used for commercial purposes  All illustrations and images included in CareNotes® are the copyrighted property of A [employer]  or Stanley Donohue  The above information is an  only  It is not intended as medical advice for individual conditions or treatments  Talk to your doctor, nurse or pharmacist before following any medical regimen to see if it is safe and effective for you

## 2018-03-10 ENCOUNTER — HOSPITAL ENCOUNTER (EMERGENCY)
Facility: HOSPITAL | Age: 38
Discharge: HOME | End: 2018-03-10
Attending: EMERGENCY MEDICINE | Admitting: EMERGENCY MEDICINE
Payer: COMMERCIAL

## 2018-03-10 VITALS
OXYGEN SATURATION: 97 % | HEART RATE: 94 BPM | TEMPERATURE: 98.6 F | RESPIRATION RATE: 18 BRPM | DIASTOLIC BLOOD PRESSURE: 65 MMHG | WEIGHT: 173 LBS | SYSTOLIC BLOOD PRESSURE: 112 MMHG

## 2018-03-10 DIAGNOSIS — R42 DIZZINESS: Primary | ICD-10-CM

## 2018-03-10 PROCEDURE — 99283 EMERGENCY DEPT VISIT LOW MDM: CPT

## 2018-03-10 NOTE — ED PROVIDER NOTES
History  Chief Complaint   Patient presents with    Dizziness     Patient states that he has been feeling dizziness and cold for the past 3 days  Patient states he has been lying in bed and does not have energy  Denies fevers  Denies N/V/D  Patient brought to Triage in [facility] because he was feeling too dizzy in waiting room       History provided by:  Patient   used: No    Dizziness   Quality:  Lightheadedness  Severity:  Mild  Onset quality:  Gradual  Duration:  3 days  Timing:  Intermittent  Progression:  Waxing and waning  Chronicity:  Recurrent  Context: not with loss of consciousness    Context comment:  Feeling dizzy for past 3 days, not sure if he is coming down with viral type illness as co-workers have been sick  Relieved by:  Nothing  Worsened by:  Nothing  Ineffective treatments:  None tried  Associated symptoms: no blood in stool, no chest pain, no diarrhea, no headaches, no nausea, no shortness of breath, no syncope and no vomiting    Risk factors: no multiple medications        None       Past Medical History:   Diagnosis Date    No known problems        Past Surgical History:   Procedure Laterality Date    MOUTH SURGERY      extractions    TONSILLECTOMY         History reviewed  No pertinent family history  I have reviewed and agree with the history as documented  Social History   Substance Use Topics    Smoking status: Current Every Day Smoker    Smokeless tobacco: Never Used      Comment: 10-12 cigs per day    Alcohol use Yes      Comment: occassionally        Review of Systems   Constitutional: Negative for activity change, chills and fever  HENT: Negative for facial swelling, sore throat and trouble swallowing  Eyes: Negative for pain and visual disturbance  Respiratory: Negative for cough, chest tightness and shortness of breath  Cardiovascular: Negative for chest pain, leg swelling and syncope     Gastrointestinal: Negative for abdominal pain, blood in stool, diarrhea, nausea and vomiting  Genitourinary: Negative for dysuria and flank pain  Musculoskeletal: Negative for back pain, neck pain and neck stiffness  Skin: Negative for pallor and rash  Allergic/Immunologic: Negative for environmental allergies and immunocompromised state  Neurological: Positive for dizziness  Negative for headaches  Hematological: Negative for adenopathy  Does not bruise/bleed easily  Psychiatric/Behavioral: Negative for agitation and behavioral problems  All other systems reviewed and are negative  Physical Exam  ED Triage Vitals [03/10/18 1752]   Temperature Pulse Respirations Blood Pressure SpO2   98 6 °F (37 °C) 94 18 112/65 97 %      Temp Source Heart Rate Source Patient Position - Orthostatic VS BP Location FiO2 (%)   Temporal Monitor Sitting Right arm --      Pain Score       7           Orthostatic Vital Signs  Vitals:    03/10/18 1752   BP: 112/65   Pulse: 94   Patient Position - Orthostatic VS: Sitting       Physical Exam   Constitutional: He is oriented to person, place, and time  He appears well-developed and well-nourished  No distress  HENT:   Head: Normocephalic and atraumatic  Eyes: EOM are normal    Neck: Normal range of motion  Neck supple  Cardiovascular: Normal rate, regular rhythm, normal heart sounds and intact distal pulses  Pulmonary/Chest: Effort normal and breath sounds normal    Abdominal: Soft  Bowel sounds are normal  There is no tenderness  There is no rebound and no guarding  Musculoskeletal: Normal range of motion  Neurological: He is alert and oriented to person, place, and time  No cranial nerve deficit  Coordination normal    Skin: Skin is warm and dry  Psychiatric: He has a normal mood and affect  Nursing note and vitals reviewed        ED Medications  Medications - No data to display    Diagnostic Studies  Results Reviewed     None                 No orders to display              Procedures  Procedures       Phone Contacts  ED Phone Contact    ED Course  ED Course                                MDM  Number of Diagnoses or Management Options  Dizziness: new and does not require workup  Diagnosis management comments: Patient is a 60-year-old healthy male, complains of dizziness, generalized weakness, the for the past 3 days, does not feel the energy; denies fever, chest pain, headache, neck pain, vomiting or diarrhea; not sure if he is coming down with viral type illness as co-workers have been sick  Patient is alert, oriented, well-appearing, hemodynamically stable vital signs noted; Lungs clear to auscultation bilaterally; Cardiovascular normal heart sounds:  Normal, equal pulses bilaterally; Abdomen: soft, nontender, nondistended, bowel sounds present; Neuro: normal cranial nerve, motor and sensory exam, no focal deficits; no neck stiffness; Extremities: no calf swelling or tenderness, neurovascular intact distally  Patient chart reviewed, frequent ER visits, recent blood work done 2 weeks back which was completely normal  Otherwise healthy young male, no significant complaints at this point, stable vitals; no indication for workup at this point  Patient informed and is agreeable with that  Request work note  CritCare Time    Disposition  Final diagnoses:   Dizziness     Time reflects when diagnosis was documented in both MDM as applicable and the Disposition within this note     Time User Action Codes Description Comment    3/10/2018  6:22 PM Leonides Arroyo Add [R42] Dizziness       ED Disposition     ED Disposition Condition Comment    Discharge  Todd Ruiz discharge to home/self care  Condition at discharge: Stable        Follow-up Information     Follow up With Specialties Details Why [address], [place] [facility]   [address]  [address]  [address] [employer]          YOU ARE ADVISED TO FOLLOW UP [address]          Patient's Medications   Discharge Prescriptions    No medications on file     No discharge procedures on file      ED Provider  Electronically Signed by           Aguilar Cardenas MD  03/10/18 5792

## 2018-03-10 NOTE — DISCHARGE INSTRUCTIONS
Dizziness   WHAT YOU NEED TO KNOW:   What is dizziness? Dizziness is a feeling of being off balance or unsteady  Common causes of dizziness are an inner ear fluid imbalance or a lack of oxygen in your blood  Dizziness may be acute (lasts 3 days or less) or chronic (lasts longer than 3 days)  You may have dizzy spells that last from seconds to a few hours  What increases my risk for dizziness? Dizziness may get worse during certain activities or when you move a certain way  The following may also increase your risk for dizziness:  · Older age    · An infection, ear surgery, or an inner ear condition, such as Ménière disease    · Stroke, a brain tumor, or a recent head trauma     · An injury that causes a large amount of blood loss    · Heart or blood pressure problems     · Exposure to chemicals, or long-term alcohol use     · Medicines used to treat high blood pressure, seizure disorders, or anxiety and depression     · A nerve disorder, such as multiple sclerosis  What signs and symptoms may happen with dizziness? · A feeling that your surroundings are moving even though you are standing still    · Ringing in your ears or hearing loss     · Feeling faint or lightheaded     · Weakness or unsteadiness     · Double vision or eye movements you cannot control    · Nausea or vomiting     · Confusion  How is the cause of dizziness diagnosed? Your healthcare provider may ask when the dizziness started  Tell the provider if you have dizzy spells, and how long they last  Tell him or her what happens before you become dizzy  The provider will ask if you have other health conditions and if you take any medicines  He or she will check your blood pressure and pulse to see if your dizziness may be related to your heart  Your balance, strength, reflexes, and the way you walk may also be checked   You may need any of the following tests to help find the cause of your dizziness:  · An EKG  records the electrical activity of your heart  An EKG can be used to check for an abnormal heart beat or heart damage  · Blood tests  will check your blood sugar level, infection, and your blood cell levels  · CT or MRI  pictures check for a stroke, head injury, or brain tumor  They also check for brain bleeding or swelling  You may be given contrast liquid to help your brain show up better in the pictures  Tell the healthcare provider if you have ever had an allergic reaction to contrast liquid  Do not enter the MRI room with anything metal  Metal can cause serious injury  Tell the healthcare provider if you have any metal in or on your body  How is dizziness treated? Treatment will depend on the cause of your dizziness  Your healthcare provider may give you oxygen or medicines to decrease your dizziness and nausea  He may also refer you to a specialist  Nitin Kunz may need to be admitted to the hospital for treatment  How can I manage my symptoms? · Do not drive  or operate heavy machinery when you are dizzy  · Get up slowly  from sitting or lying down  · Drink plenty of liquids  Liquids help prevent dehydration  Ask how much liquid to drink each day and which liquids are best for you  When should I seek immediate care? · You have a headache and a stiff neck  · You have shaking chills and a fever  · You vomit over and over with no relief  · Your vomit or bowel movements are red or black  · You have pain in your chest, back, or abdomen  · You have numbness, especially in your face, arms, or legs  · You have trouble moving your arms or legs  · You are confused  When should I contact my healthcare provider? · You have a fever  · Your symptoms do not get better with treatment  · You have questions or concerns about your condition or care  CARE AGREEMENT:   You have the right to help plan your care  Learn about your health condition and how it may be treated   Discuss treatment options with your caregivers to decide what care you want to receive  You always have the right to refuse treatment  The above information is an  only  It is not intended as medical advice for individual conditions or treatments  Talk to your doctor, nurse or pharmacist before following any medical regimen to see if it is safe and effective for you  © 2017 [address] [employer] Information is for End User's use only and may not be sold, redistributed or otherwise used for commercial purposes  All illustrations and images included in CareNotes® are the copyrighted property of [employer]  or Stanley Donohue

## 2018-03-14 ENCOUNTER — OFFICE VISIT (OUTPATIENT)
Dept: URGENT CARE | Facility: MEDICAL CENTER | Age: 38
End: 2018-03-14
Payer: COMMERCIAL

## 2018-03-14 VITALS
TEMPERATURE: 99.3 F | OXYGEN SATURATION: 98 % | WEIGHT: 173 LBS | HEART RATE: 78 BPM | RESPIRATION RATE: 18 BRPM | DIASTOLIC BLOOD PRESSURE: 96 MMHG | BODY MASS INDEX: 22.93 KG/M2 | HEIGHT: 73 IN | SYSTOLIC BLOOD PRESSURE: 126 MMHG

## 2018-03-14 DIAGNOSIS — R19.7 DIARRHEA, UNSPECIFIED TYPE: Primary | ICD-10-CM

## 2018-03-14 PROCEDURE — 99213 OFFICE O/P EST LOW 20 MIN: CPT | Performed by: PHYSICIAN ASSISTANT

## 2018-03-14 NOTE — LETTER
March 14, 2018     Patient: Chema You   YOB: 1980   Date of Visit: 3/14/2018       To Whom It May Concern: It is my medical opinion that Chema You may return to work on 03/15/2018  If you have any questions or concerns, please don't hesitate to call           Sincerely,        Lionel Orr PA-C    CC: No Recipients

## 2018-03-14 NOTE — PATIENT INSTRUCTIONS
Chronic vomiting and diarrhea predominance of diarrhea:  Patient was seen in several ERs over the past 3 months and all testing was negative  He was advised to keep his appointment with his new PCP next week  Advised him he may need to have a colonoscopy and possible treatment for anxiety  Advised him to discuss that with his new physician  In the meantime I advised him to get Imodium to help with diarrhea

## 2018-03-14 NOTE — PROGRESS NOTES
[address] [facility]        NAME: Jacinta Davis is a 40 y o  male  : 1980    MRN: [id number]  DATE: 2018  TIME: 3:27 PM    Assessment and Plan   Diarrhea, unspecified type [R19 7]  1  Diarrhea, unspecified type           Patient Instructions     Follow up with PCP in 3-5 days  Proceed to  ER if symptoms worsen  Chief Complaint     Chief Complaint   Patient presents with    GI Problem     "angelian and on" x several months: N/V/D; had recent "Neg" c diff testing         History of Present Illness       Patient presents with a three-month history of diarrhea and occasional nausea and vomiting  Patient states his symptoms have been ongoing since December  He was seen in several ERs a total of 7 times since December  He had blood work and stool cultures done which all were negative  Patient does admit to some anxiety symptoms  He was recently prescribed bed told but did not get the prescription  He was also advised to take Imodium but did not get that either  Review of Systems   Review of Systems   Constitutional: Negative for activity change, appetite change, chills, diaphoresis, fatigue, fever and unexpected weight change  HENT: Negative  Eyes: Negative  Respiratory: Negative  Cardiovascular: Negative  Gastrointestinal: Positive for abdominal pain, diarrhea, nausea and vomiting  Negative for abdominal distention, anal bleeding, blood in stool, constipation and rectal pain  Genitourinary: Negative  Musculoskeletal: Negative  Skin: Negative  Neurological: Positive for dizziness and weakness  Negative for tremors, seizures, syncope, speech difficulty, light-headedness, numbness and headaches  Psychiatric/Behavioral: Negative for agitation, confusion, decreased concentration, dysphoric mood, hallucinations, self-injury, sleep disturbance and suicidal ideas  The patient is nervous/anxious            Current Medications     No current outpatient prescriptions on file     Current Allergies     Allergies as of 03/14/2018 - Reviewed 03/14/2018   Allergen Reaction Noted    Keflex [cephalexin] Anaphylaxis 02/20/2016    Penicillins Anaphylaxis 02/20/2016            The following portions of the patient's history were reviewed and updated as appropriate: allergies, current medications, past family history, past medical history, past social history, past surgical history and problem list     Objective   /96   Pulse 78   Temp 99 3 °F (37 4 °C)   Resp 18   Ht 6' 0 5" (1 842 m)   Wt 78 5 kg (173 lb)   SpO2 98%   BMI 23 14 kg/m²        Physical Exam     Physical Exam   Constitutional: He appears well-developed and well-nourished  No distress  HENT:   Right Ear: Tympanic membrane, external ear and ear canal normal    Left Ear: Tympanic membrane, external ear and ear canal normal    Nose: No mucosal edema or rhinorrhea  Mouth/Throat: Oropharynx is clear and moist  No oropharyngeal exudate, posterior oropharyngeal edema or posterior oropharyngeal erythema  Eyes: Conjunctivae are normal    Cardiovascular: Normal rate and regular rhythm  Pulmonary/Chest: Effort normal and breath sounds normal    Abdominal: Soft  Normal appearance and bowel sounds are normal  There is tenderness in the left lower quadrant  There is no rigidity, no rebound, no guarding and no CVA tenderness  Lymphadenopathy:        Head (right side): No tonsillar adenopathy present  Head (left side): No tonsillar adenopathy present  Skin: No rash noted  Nursing note and vitals reviewed

## 2018-03-20 ENCOUNTER — HOSPITAL ENCOUNTER (EMERGENCY)
Facility: HOSPITAL | Age: 38
Discharge: HOME | End: 2018-03-20
Attending: EMERGENCY MEDICINE | Admitting: EMERGENCY MEDICINE
Payer: COMMERCIAL

## 2018-03-20 ENCOUNTER — APPOINTMENT (EMERGENCY)
Dept: RADIOLOGY | Facility: HOSPITAL | Age: 38
End: 2018-03-20
Payer: COMMERCIAL

## 2018-03-20 VITALS
TEMPERATURE: 97.8 F | SYSTOLIC BLOOD PRESSURE: 117 MMHG | DIASTOLIC BLOOD PRESSURE: 69 MMHG | HEIGHT: 72 IN | RESPIRATION RATE: 18 BRPM | HEART RATE: 59 BPM | WEIGHT: 167.55 LBS | BODY MASS INDEX: 22.69 KG/M2 | OXYGEN SATURATION: 99 %

## 2018-03-20 DIAGNOSIS — R07.89 CHEST WALL PAIN: ICD-10-CM

## 2018-03-20 DIAGNOSIS — K29.70 GASTRITIS: ICD-10-CM

## 2018-03-20 DIAGNOSIS — J06.9 VIRAL URI WITH COUGH: Primary | ICD-10-CM

## 2018-03-20 LAB
ALBUMIN SERPL BCP-MCNC: 4.5 G/DL (ref 3.5–5)
ALP SERPL-CCNC: 73 U/L (ref 46–116)
ALT SERPL W P-5'-P-CCNC: 21 U/L (ref 12–78)
ANION GAP SERPL CALCULATED.3IONS-SCNC: 10 MMOL/L (ref 4–13)
APTT PPP: 32 SECONDS (ref 23–35)
AST SERPL W P-5'-P-CCNC: 20 U/L (ref 5–45)
ATRIAL RATE: 67 BPM
BASOPHILS # BLD AUTO: 0.06 THOUSANDS/ΜL (ref 0–0.1)
BASOPHILS NFR BLD AUTO: 1 % (ref 0–1)
BILIRUB SERPL-MCNC: 0.59 MG/DL (ref 0.2–1)
BUN SERPL-MCNC: 12 MG/DL (ref 5–25)
CALCIUM SERPL-MCNC: 9.5 MG/DL (ref 8.3–10.1)
CHLORIDE SERPL-SCNC: 100 MMOL/L (ref 100–108)
CO2 SERPL-SCNC: 27 MMOL/L (ref 21–32)
CREAT SERPL-MCNC: 0.79 MG/DL (ref 0.6–1.3)
EOSINOPHIL # BLD AUTO: 0.08 THOUSAND/ΜL (ref 0–0.61)
EOSINOPHIL NFR BLD AUTO: 1 % (ref 0–6)
ERYTHROCYTE [DISTWIDTH] IN BLOOD BY AUTOMATED COUNT: 13.2 % (ref 11.6–15.1)
GFR SERPL CREATININE-BSD FRML MDRD: 116 ML/MIN/1.73SQ M
GLUCOSE SERPL-MCNC: 103 MG/DL (ref 65–140)
HCT VFR BLD AUTO: 43.1 % (ref 36.5–49.3)
HGB BLD-MCNC: 15.4 G/DL (ref 12–17)
INR PPP: 0.98 (ref 0.86–1.16)
LIPASE SERPL-CCNC: 90 U/L (ref 73–393)
LYMPHOCYTES # BLD AUTO: 1.55 THOUSANDS/ΜL (ref 0.6–4.47)
LYMPHOCYTES NFR BLD AUTO: 18 % (ref 14–44)
MCH RBC QN AUTO: 31.7 PG (ref 26.8–34.3)
MCHC RBC AUTO-ENTMCNC: 35.7 G/DL (ref 31.4–37.4)
MCV RBC AUTO: 89 FL (ref 82–98)
MONOCYTES # BLD AUTO: 0.7 THOUSAND/ΜL (ref 0.17–1.22)
MONOCYTES NFR BLD AUTO: 8 % (ref 4–12)
NEUTROPHILS # BLD AUTO: 6.31 THOUSANDS/ΜL (ref 1.85–7.62)
NEUTS SEG NFR BLD AUTO: 72 % (ref 43–75)
NRBC BLD AUTO-RTO: 0 /100 WBCS
P AXIS: 47 DEGREES
PLATELET # BLD AUTO: 262 THOUSANDS/UL (ref 149–390)
PMV BLD AUTO: 9.5 FL (ref 8.9–12.7)
POTASSIUM SERPL-SCNC: 4.2 MMOL/L (ref 3.5–5.3)
PR INTERVAL: 122 MS
PROT SERPL-MCNC: 7.9 G/DL (ref 6.4–8.2)
PROTHROMBIN TIME: 13 SECONDS (ref 12.1–14.4)
QRS AXIS: 97 DEGREES
QRSD INTERVAL: 110 MS
QT INTERVAL: 380 MS
QTC INTERVAL: 401 MS
RBC # BLD AUTO: 4.86 MILLION/UL (ref 3.88–5.62)
SODIUM SERPL-SCNC: 137 MMOL/L (ref 136–145)
T WAVE AXIS: 54 DEGREES
TROPONIN I SERPL-MCNC: <0.02 NG/ML
VENTRICULAR RATE: 67 BPM
WBC # BLD AUTO: 8.7 THOUSAND/UL (ref 4.31–10.16)

## 2018-03-20 PROCEDURE — 36415 COLL VENOUS BLD VENIPUNCTURE: CPT | Performed by: EMERGENCY MEDICINE

## 2018-03-20 PROCEDURE — 84484 ASSAY OF TROPONIN QUANT: CPT | Performed by: EMERGENCY MEDICINE

## 2018-03-20 PROCEDURE — 96374 THER/PROPH/DIAG INJ IV PUSH: CPT

## 2018-03-20 PROCEDURE — 93005 ELECTROCARDIOGRAM TRACING: CPT | Performed by: EMERGENCY MEDICINE

## 2018-03-20 PROCEDURE — 80053 COMPREHEN METABOLIC PANEL: CPT | Performed by: EMERGENCY MEDICINE

## 2018-03-20 PROCEDURE — 71046 X-RAY EXAM CHEST 2 VIEWS: CPT

## 2018-03-20 PROCEDURE — 83690 ASSAY OF LIPASE: CPT | Performed by: EMERGENCY MEDICINE

## 2018-03-20 PROCEDURE — 96361 HYDRATE IV INFUSION ADD-ON: CPT

## 2018-03-20 PROCEDURE — 93010 ELECTROCARDIOGRAM REPORT: CPT | Performed by: INTERNAL MEDICINE

## 2018-03-20 PROCEDURE — 85610 PROTHROMBIN TIME: CPT | Performed by: EMERGENCY MEDICINE

## 2018-03-20 PROCEDURE — 99285 EMERGENCY DEPT VISIT HI MDM: CPT

## 2018-03-20 PROCEDURE — 96375 TX/PRO/DX INJ NEW DRUG ADDON: CPT

## 2018-03-20 PROCEDURE — 85730 THROMBOPLASTIN TIME PARTIAL: CPT | Performed by: EMERGENCY MEDICINE

## 2018-03-20 PROCEDURE — 85025 COMPLETE CBC W/AUTO DIFF WBC: CPT | Performed by: EMERGENCY MEDICINE

## 2018-03-20 RX ORDER — KETOROLAC TROMETHAMINE 30 MG/ML
30 INJECTION, SOLUTION INTRAMUSCULAR; INTRAVENOUS ONCE
Status: COMPLETED | OUTPATIENT
Start: 2018-03-20 | End: 2018-03-20

## 2018-03-20 RX ORDER — LOPERAMIDE HYDROCHLORIDE 2 MG/1
2 TABLET ORAL 4 TIMES DAILY
COMMUNITY
Start: 2018-03-13 | End: 2019-02-09 | Stop reason: ALTCHOICE

## 2018-03-20 RX ORDER — MELATONIN
1000 DAILY
COMMUNITY
End: 2019-02-06

## 2018-03-20 RX ORDER — FAMOTIDINE 20 MG/1
20 TABLET, FILM COATED ORAL 2 TIMES DAILY
Qty: 30 TABLET | Refills: 0 | Status: SHIPPED | OUTPATIENT
Start: 2018-03-20 | End: 2019-01-07

## 2018-03-20 RX ADMIN — KETOROLAC TROMETHAMINE 30 MG: 30 INJECTION, SOLUTION INTRAMUSCULAR at 09:20

## 2018-03-20 RX ADMIN — FAMOTIDINE 20 MG: 10 INJECTION, SOLUTION INTRAVENOUS at 09:22

## 2018-03-20 RX ADMIN — SODIUM CHLORIDE 500 ML: 0.9 INJECTION, SOLUTION INTRAVENOUS at 09:20

## 2018-03-20 NOTE — DISCHARGE INSTRUCTIONS
Tylenol for pain    Acute Bronchitis   WHAT YOU NEED TO KNOW:   Acute bronchitis is swelling and irritation in the air passages of your lungs  This irritation may cause you to cough or have other breathing problems  Acute bronchitis often starts because of another illness, such as a cold or the flu  The illness spreads from your nose and throat to your windpipe and airways  Bronchitis is often called a chest cold  Acute bronchitis lasts about 3 to 6 weeks and is usually not a serious illness  Your cough can last for several weeks  DISCHARGE INSTRUCTIONS:   Return to the emergency department if:   · You cough up blood  · Your lips or fingernails turn blue  · You feel like you are not getting enough air when you breathe  Contact your healthcare provider if:   · You have a fever  · Your breathing problems do not go away or get worse  · Your cough does not get better within 4 weeks  · You have questions or concerns about your condition or care  Self-care:   · Get more rest   Rest helps your body to heal  Slowly start to do more each day  Rest when you feel it is needed  · Avoid irritants in the air  Avoid chemicals, fumes, and dust  Wear a face mask if you must work around dust or fumes  Stay inside on days when air pollution levels are high  If you have allergies, stay inside when pollen counts are high  Do not use aerosol products, such as spray-on deodorant, bug spray, and hair spray  · Do not smoke or be around others who smoke  Nicotine and other chemicals in cigarettes and cigars damages the cilia that move mucus out of your lungs  Ask your healthcare provider for information if you currently smoke and need help to quit  E-cigarettes or smokeless tobacco still contain nicotine  Talk to your healthcare provider before you use these products  · Drink liquids as directed  Liquids help keep your air passages moist and help you cough up mucus   You may need to drink more liquids when you have acute bronchitis  Ask how much liquid to drink each day and which liquids are best for you  · Use a humidifier or vaporizer  Use a cool mist humidifier or a vaporizer to increase air moisture in your home  This may make it easier for you to breathe and help decrease your cough  Decrease risk for acute bronchitis:   · Get the vaccinations you need  Ask your healthcare provider if you should get vaccinated against the flu or pneumonia  · Prevent the spread of germs  You can decrease your risk of acute bronchitis and other illnesses by doing the following:     [facility] [place] [place] [employer] your hands often with soap and water  Carry germ-killing hand lotion or gel with you  You can use the lotion or gel to clean your hands when soap and water are not available  ¨ Do not touch your eyes, nose, or mouth unless you have washed your hands first     ¨ Always cover your mouth when you cough to prevent the spread of germs  It is best to cough into a tissue or your shirt sleeve instead of into your hand  Ask those around you cover their mouths when they cough  ¨ Try to avoid people who have a cold or the flu  If you are sick, stay away from others as much as possible  Medicines: Your healthcare provider may  give you any of the following:  · Ibuprofen or acetaminophen  are medicines that help lower your fever  They are available without a doctor's order  Ask your healthcare provider which medicine is right for you  Ask how much to take and how often to take it  Follow directions  These medicines can cause stomach bleeding if not taken correctly  Ibuprofen can cause kidney damage  Do not take ibuprofen if you have kidney disease, an ulcer, or allergies to aspirin  Acetaminophen can cause liver damage  Do not take more than 4,000 milligrams in 24 hours  · Decongestants  help loosen mucus in your lungs and make it easier to cough up  This can help you breathe easier  · Cough suppressants  decrease your urge to cough   If your cough produces mucus, do not take a cough suppressant unless your healthcare provider tells you to  Your healthcare provider may suggest that you take a cough suppressant at night so you can rest     · Inhalers  may be given  Your healthcare provider may give you one or more inhalers to help you breathe easier and cough less  An inhaler gives your medicine to open your airways  Ask your healthcare provider to show you how to use your inhaler correctly  · Take your medicine as directed  Contact your healthcare provider if you think your medicine is not helping or if you have side effects  Tell him of her if you are allergic to any medicine  Keep a list of the medicines, vitamins, and herbs you take  Include the amounts, and when and why you take them  Bring the list or the pill bottles to follow-up visits  Carry your medicine list with you in case of an emergency  Follow up with your healthcare provider as directed:  Write down questions you have so you will remember to ask them during your follow-up visits  © 2017 [facility] Information is for End User's use only and may not be sold, redistributed or otherwise used for commercial purposes  All illustrations and images included in CareNotes® are the copyrighted property of A D [employer] M [employer]  or Stanley Donohue  The above information is an  only  It is not intended as medical advice for individual conditions or treatments  Talk to your doctor, nurse or pharmacist before following any medical regimen to see if it is safe and effective for you  Diet for Stomach Ulcers and Gastritis   WHAT YOU NEED TO KNOW:   A diet for stomach ulcers and gastritis is a meal plan that limits foods that irritate your stomach  Certain foods may worsen symptoms such as stomach pain, bloating, heartburn, or indigestion  DISCHARGE INSTRUCTIONS:   Foods to limit or avoid:  You may need to avoid acidic, spicy, or high-fat foods   Not all foods affect everyone the same way  You will need to learn which foods worsen your symptoms and limit those foods  The following are some foods that may worsen ulcer or gastritis symptoms:  · Beverages:      ¨ Whole milk and chocolate milk    ¨ Hot cocoa and cola    ¨ Any beverage with caffeine    ¨ Regular and decaffeinated coffee    ¨ Peppermint and spearmint tea    ¨ Green and black tea, with or without caffeine    ¨ Orange and grapefruit juices    ¨ Drinks that contain alcohol    · Spices and seasonings:      ¨ Black and red pepper    ¨ Chili powder    ¨ Mustard seed and nutmeg    · Other foods:      ¨ Dairy foods made from whole milk or cream    ¨ Chocolate    ¨ Spicy or strongly flavored cheeses, such as jalapeno or black pepper    ¨ Highly seasoned, high-fat meats, such as sausage, salami, smith, ham, and cold cuts    ¨ Hot chiles and peppers    ¨ Tomato products, such as tomato paste, tomato sauce, or tomato juice  Foods to include:  Eat a variety of healthy foods from all the food groups  Eat fruits, vegetables, whole grains, and fat-free or low-fat dairy foods  Whole grains include whole-wheat breads, cereals, pasta, and brown rice  Choose lean meats, poultry (chicken and turkey), fish, beans, eggs, and nuts  A healthy meal plan is low in unhealthy fats, salt, and added sugar  Healthy fats include olive oil and canola oil  Ask your dietitian for more information about a healthy diet  Other helpful guidelines:   · Do not eat right before bedtime  Stop eating at least 2 hours before bedtime  · Eat small, frequent meals  Your stomach may tolerate small, frequent meals better than large meals  © 2017 [address] [facility] Information is for End User's use only and may not be sold, redistributed or otherwise used for commercial purposes  All illustrations and images included in CareNotes® are the copyrighted property of A D A M [employer] Inc  or Stanley Donohue    The above information is an  only  It is not intended as medical advice for individual conditions or treatments  Talk to your doctor, nurse or pharmacist before following any medical regimen to see if it is safe and effective for you

## 2018-03-20 NOTE — ED PROVIDER NOTES
History  Chief Complaint   Patient presents with    Chest Pain     Pt reports constant chest pain described as burning since last night  + Dizziness, producitve cough     C/o substernal chest pain, epigastric pain since last night, intermittent pain lasting up to 20 min  Pt  Has had acid reflux in the past - not taking meds for that  No cardiac history  No cardiac history or risk factors for CAD except for smoking  No h/o dvt or PE  No fever  +sore throat and cough for a few days  Cp is worse with cough  No pleuritic chest pain  Pt  Had asthma as a kid but none now  Pt  Was seen at [address] [place] [address] for dizziness and diarrhea which he doesn't have now  Prior to Admission Medications   Prescriptions Last Dose Informant Patient Reported? Taking? cholecalciferol (VITAMIN D3) 1,000 units tablet   Yes Yes   Sig: Take 1,000 Units by mouth daily   loperamide (IMODIUM A-D) 2 MG tablet   Yes Yes   Sig: Take 2 mg by mouth 4 (four) times a day   vitamin A 10,000 units capsule   Yes Yes   Sig: Take 10,000 Units by mouth daily      Facility-Administered Medications: None       Past Medical History:   Diagnosis Date    No known problems        Past Surgical History:   Procedure Laterality Date    MOUTH SURGERY      extractions    TONSILLECTOMY         Family History   Problem Relation Age of Onset    Hypertension Mother     Heart attack Mother      I have reviewed and agree with the history as documented  Social History   Substance Use Topics    Smoking status: Current Every Day Smoker     Packs/day: 0 25     Types: Cigarettes    Smokeless tobacco: Never Used    Alcohol use Yes      Comment: occassionally        Review of Systems   Constitutional: Negative for appetite change, fatigue and fever  HENT: Negative for rhinorrhea and sore throat  Respiratory: Negative for cough, shortness of breath and wheezing  Cardiovascular: Positive for chest pain  Negative for leg swelling  Gastrointestinal: Negative for abdominal pain, diarrhea and vomiting  Genitourinary: Negative for dysuria and flank pain  Musculoskeletal: Negative for back pain and neck pain  Skin: Negative for rash  Neurological: Negative for syncope and headaches  Psychiatric/Behavioral:        Mood normal       Physical Exam  ED Triage Vitals [03/20/18 0828]   Temperature Pulse Respirations Blood Pressure SpO2   97 8 °F (36 6 °C) 76 18 145/83 100 %      Temp Source Heart Rate Source Patient Position - Orthostatic VS BP Location FiO2 (%)   Oral Monitor -- Right arm --      Pain Score       8           Orthostatic Vital Signs  Vitals:    03/20/18 0919 03/20/18 1015 03/20/18 1030 03/20/18 1044   BP: 123/73   117/69   Pulse: 62 (!) 54 (!) 54 59       Physical Exam   Constitutional: He is oriented to person, place, and time  He appears well-developed and well-nourished  HENT:   Head: Normocephalic and atraumatic  Neck: Normal range of motion  Neck supple  Cardiovascular: Normal rate and regular rhythm  Pulmonary/Chest: Effort normal and breath sounds normal  No respiratory distress  He has no wheezes  Abdominal: Soft  There is no tenderness  Musculoskeletal: Normal range of motion  Neurological: He is alert and oriented to person, place, and time  Skin: Skin is warm and dry  Nursing note and vitals reviewed        ED Medications  Medications   sodium chloride 0 9 % bolus 500 mL (0 mL Intravenous Stopped 3/20/18 1001)   ketorolac (TORADOL) injection 30 mg (30 mg Intravenous Given 3/20/18 0920)   famotidine (PEPCID) injection 20 mg (20 mg Intravenous Given 3/20/18 0922)       Diagnostic Studies  Results Reviewed     Procedure Component Value Units Date/Time    Lipase [[id number]]  (Normal) Collected:  03/20/18 0919    Lab Status:  Final result Specimen:  Blood from Arm, Right Updated:  03/20/18 1123     Lipase 90 u/L     Troponin I [[id number]]  (Normal) Collected:  03/20/18 0919    Lab Status:  Final result Specimen:  Blood from Arm, Right Updated:  03/20/18 0948     Troponin I <0 02 ng/mL     Narrative:         [employer] Chemistry analyzer 99% cutoff is > 0 04 ng/mL in network labs    o cTnI 99% cutoff is useful only when applied to patients in the clinical setting of myocardial ischemia  o cTnI 99% cutoff should be interpreted in the context of clinical history, ECG findings and possibly cardiac imaging to establish correct diagnosis  o cTnI 99% cutoff may be suggestive but clearly not indicative of a coronary event without the clinical setting of myocardial ischemia  Protime-INR [[id number]]  (Normal) Collected:  03/20/18 0919    Lab Status:  Final result Specimen:  Blood from Arm, Right Updated:  03/20/18 0945     Protime 13 0 seconds      INR 0 98    APTT [[id number]]  (Normal) Collected:  03/20/18 0919    Lab Status:  Final result Specimen:  Blood from Arm, Right Updated:  03/20/18 0945     PTT 32 seconds     Narrative: Therapeutic Heparin Range = 60-90 seconds    Comprehensive metabolic panel [[id number]] Collected:  03/20/18 0919    Lab Status:  Final result Specimen:  Blood from Arm, Right Updated:  03/20/18 0943     Sodium 137 mmol/L      Potassium 4 2 mmol/L      Chloride 100 mmol/L      CO2 27 mmol/L      Anion Gap 10 mmol/L      BUN 12 mg/dL      Creatinine 0 79 mg/dL      Glucose 103 mg/dL      Calcium 9 5 mg/dL      AST 20 U/L      ALT 21 U/L      Alkaline Phosphatase 73 U/L      Total Protein 7 9 g/dL      Albumin 4 5 g/dL      Total Bilirubin 0 59 mg/dL      eGFR 116 ml/min/1 73sq m     Narrative:         National Kidney Disease Education Program recommendations are as follows:  GFR calculation is accurate only with a steady state creatinine  Chronic Kidney disease less than 60 ml/min/1 73 sq  meters  Kidney failure less than 15 ml/min/1 73 sq  meters      CBC and differential [[id number]]  (Normal) Collected:  03/20/18 0919    Lab Status:  Final result Specimen:  Blood from Arm, Right Updated: 03/20/18 0931     WBC 8 70 Thousand/uL      RBC 4 86 Million/uL      Hemoglobin 15 4 g/dL      Hematocrit 43 1 %      MCV 89 fL      MCH 31 7 pg      MCHC 35 7 g/dL      RDW 13 2 %      MPV 9 5 fL      Platelets 838 Thousands/uL      nRBC 0 /100 WBCs      Neutrophils Relative 72 %      Lymphocytes Relative 18 %      Monocytes Relative 8 %      Eosinophils Relative 1 %      Basophils Relative 1 %      Neutrophils Absolute 6 31 Thousands/µL      Lymphocytes Absolute 1 55 Thousands/µL      Monocytes Absolute 0 70 Thousand/µL      Eosinophils Absolute 0 08 Thousand/µL      Basophils Absolute 0 06 Thousands/µL                  XR chest 2 views   Final Result by Saadia Powers MD (03/20 1007)      No acute cardiopulmonary disease  Workstation performed: [id number]                    Procedures  Procedures       Phone Contacts  ED Phone Contact    ED Course  ED Course                                MDM  Number of Diagnoses or Management Options  Chest wall pain:   Gastritis:   Viral URI with cough:      Amount and/or Complexity of Data Reviewed  Clinical lab tests: ordered and reviewed  Tests in the radiology section of CPT®: ordered and reviewed    Risk of Complications, Morbidity, and/or Mortality  Presenting problems: moderate      CritCare Time    Disposition  Final diagnoses:   Viral URI with cough   Chest wall pain   Gastritis     Time reflects when diagnosis was documented in both MDM as applicable and the Disposition within this note     Time User Action Codes Description Comment    3/20/2018 10:58 AM Ravinder Rasheed Add [J06 9,  B97 89] Viral URI with cough     3/20/2018 10:58 AM Marilyn Rasheed Add [R07 89] Chest wall pain     3/20/2018 10:58 AM Ravinder Rasheed Add [K29 70] Gastritis       ED Disposition     ED Disposition Condition Comment    Discharge  [facility] discharge to home/self care      Condition at discharge: Stable        Follow-up Information     Follow up With Specialties Details Why [facility]   [address] [place] [address]  [id number] [phone]        Discharge Medication List as of 3/20/2018 10:59 AM      START taking these medications    Details   famotidine (PEPCID) 20 mg tablet Take 1 tablet (20 mg total) by mouth 2 (two) times a day, Starting Tue 3/20/2018, Print         CONTINUE these medications which have NOT CHANGED    Details   cholecalciferol (VITAMIN D3) 1,000 units tablet Take 1,000 Units by mouth daily, Historical Med      loperamide (IMODIUM A-D) 2 MG tablet Take 2 mg by mouth 4 (four) times a day, Starting Tue 3/13/2018, Historical Med      vitamin A 10,000 units capsule Take 10,000 Units by mouth daily, Historical Med           No discharge procedures on file      ED Provider  Electronically Signed by           Houston Quarles MD  03/22/18 9046

## 2018-03-20 NOTE — ED NOTES
Patient returned from xray, requesting to use bathroom prior to blood work being drawn    Patient ambulated with out difficulty to bathroom     America Ramírez RN  03/20/18 1381

## 2018-03-20 NOTE — ED NOTES
Patient reports that he is feeling much better, chest pain has resolved, states that he is ready to go home         Genny Villasenor RN  03/20/18 7851

## 2018-03-22 ENCOUNTER — OFFICE VISIT (OUTPATIENT)
Dept: FAMILY MEDICINE CLINIC | Facility: CLINIC | Age: 38
End: 2018-03-22
Payer: COMMERCIAL

## 2018-03-22 VITALS
WEIGHT: 163.6 LBS | TEMPERATURE: 98.7 F | OXYGEN SATURATION: 97 % | DIASTOLIC BLOOD PRESSURE: 84 MMHG | HEIGHT: 70 IN | RESPIRATION RATE: 18 BRPM | HEART RATE: 101 BPM | BODY MASS INDEX: 23.42 KG/M2 | SYSTOLIC BLOOD PRESSURE: 120 MMHG

## 2018-03-22 DIAGNOSIS — R42 DIZZINESS: ICD-10-CM

## 2018-03-22 DIAGNOSIS — K58.0 IRRITABLE BOWEL SYNDROME WITH DIARRHEA: Primary | ICD-10-CM

## 2018-03-22 DIAGNOSIS — F32.A ANXIETY AND DEPRESSION: ICD-10-CM

## 2018-03-22 DIAGNOSIS — F41.9 ANXIETY AND DEPRESSION: ICD-10-CM

## 2018-03-22 PROCEDURE — 99204 OFFICE O/P NEW MOD 45 MIN: CPT | Performed by: PHYSICIAN ASSISTANT

## 2018-03-22 RX ORDER — DICYCLOMINE HYDROCHLORIDE 10 MG/1
10 CAPSULE ORAL
Qty: 90 CAPSULE | Refills: 2 | Status: SHIPPED | OUTPATIENT
Start: 2018-03-22

## 2018-03-22 RX ORDER — MIRTAZAPINE 15 MG/1
15 TABLET, FILM COATED ORAL
Qty: 30 TABLET | Refills: 2 | Status: SHIPPED | OUTPATIENT
Start: 2018-03-22 | End: 2019-01-07

## 2018-03-22 NOTE — ASSESSMENT & PLAN NOTE
Did inform patient that the dizziness may be multifactorial   This can include poor diet and hydration, or it could also be due to depression and anxiety  Discussed with patient that he should try to eat regularly throughout the day which can include small snacks  Also to drink 8 or more glass of water a day  If there is no improvement symptoms will order CT of the head fine possible causes

## 2018-03-22 NOTE — LETTER
March 22, 2018     Patient: Margarita Van   YOB: 1980   Date of Visit: 3/22/2018       To Whom it May Concern:    Margarita Van is under my professional care  He was seen in my office on 3/22/2018  He may return to work on 3/23/2018 without limitations  If you have any questions or concerns, please don't hesitate to call           Sincerely,          Meena Guidry PA-C        CC: No Recipients

## 2018-03-22 NOTE — ASSESSMENT & PLAN NOTE
There is a possibility that part of his GI symptoms may be due to irritable bowel syndrome  At this time will start with Bentyl to see if this provides any relief of his symptoms

## 2018-03-22 NOTE — ASSESSMENT & PLAN NOTE
For anxiety and depression will start on mirtazapine  Discussed with patient side effects of medication  Informed him that we will start on lower dose and increase as tolerated  If there is any concerns with I defects please call the office we can switch medication

## 2018-03-22 NOTE — PROGRESS NOTES
Assessment/Plan:    Irritable bowel syndrome with diarrhea  There is a possibility that part of his GI symptoms may be due to irritable bowel syndrome  At this time will start with Bentyl to see if this provides any relief of his symptoms  Anxiety and depression  For anxiety and depression will start on mirtazapine  Discussed with patient side effects of medication  Informed him that we will start on lower dose and increase as tolerated  If there is any concerns with I defects please call the office we can switch medication  Dizziness  Did inform patient that the dizziness may be multifactorial   This can include poor diet and hydration, or it could also be due to depression and anxiety  Discussed with patient that he should try to eat regularly throughout the day which can include small snacks  Also to drink 8 or more glass of water a day  If there is no improvement symptoms will order CT of the head fine possible causes  Diagnoses and all orders for this visit:    Irritable bowel syndrome with diarrhea  -     dicyclomine (BENTYL) 10 mg capsule; Take 1 capsule (10 mg total) by mouth 3 (three) times a day before meals    Anxiety and depression  -     mirtazapine (REMERON) 15 mg tablet; Take 1 tablet (15 mg total) by mouth daily at bedtime    Dizziness          Subjective:      Patient ID: Jossy Bonilla is a 40 y o  male  78-year-old male presenting with multiple concerns  States over the last 3 months patient has been experiencing frequent episodes of diarrhea, dizziness, nausea vomiting, chest pain  Has been to the emergency room several times and everything has been coming back normal   Patient does state that he has been diagnosed with depression and anxiety in the past, and was prescribed Klonopin by his previous provider  Patient did not want to take medication due to concerns of side effects    Does admit that he has been having increased depression and anxiety recently due to his current symptoms  Patient states that he will have diarrhea for 1-2 days every week or every other week  States he will have 6-7 bowel movements a day  States that the bowel movements are watery  When having the symptoms denies any abdominal pain, rectal pain, blood in stool  Nausea symptoms appear to be random, and so does the vomiting  States that he may vomit up when he had previously eaten, or it may just be stomach acid  Does have epigastric pain prior to symptoms occurring  At times will also have blurry vision prior to episodes  Does not appear to be associated with foods  Has been taking Pepcid without any relief of symptoms  Patient has also been experiencing dizziness  States he notices it more when he has the diarrhea  When symptoms occur states he feels weak throughout his whole body  Times will feel like he will collapse  Describes it as a lightheaded sensation  One episodes current denies any chest pain, shortness of breath, blurry vision  Patient does state that he will drink about 2 glasses of water a day and may take frequent drinks from the water fountain at work  Patient has a poor diet due to poor finances  States he will try to eat whenever he can  He is currently getting 2 and half to 6 hours of sleep at night  The following portions of the patient's history were reviewed and updated as appropriate:   He  has a past medical history of No known problems  He   Patient Active Problem List    Diagnosis Date Noted    Irritable bowel syndrome with diarrhea 03/22/2018    Anxiety and depression 03/22/2018    Dizziness 03/22/2018     He  has a past surgical history that includes Tonsillectomy and Mouth surgery  His family history includes Heart attack in his mother; Hypertension in his mother; No Known Problems in his father  He  reports that he has been smoking Cigarettes  He has been smoking about 0 25 packs per day   He has never used smokeless tobacco  He reports that he drinks alcohol  He reports that he does not use drugs  Current Outpatient Prescriptions   Medication Sig Dispense Refill    cholecalciferol (VITAMIN D3) 1,000 units tablet Take 1,000 Units by mouth daily      famotidine (PEPCID) 20 mg tablet Take 1 tablet (20 mg total) by mouth 2 (two) times a day 30 tablet 0    loperamide (IMODIUM A-D) 2 MG tablet Take 2 mg by mouth 4 (four) times a day      vitamin A 10,000 units capsule Take 10,000 Units by mouth daily      dicyclomine (BENTYL) 10 mg capsule Take 1 capsule (10 mg total) by mouth 3 (three) times a day before meals 90 capsule 2    mirtazapine (REMERON) 15 mg tablet Take 1 tablet (15 mg total) by mouth daily at bedtime 30 tablet 2     No current facility-administered medications for this visit  He is allergic to keflex [cephalexin] and penicillins       Review of Systems   Constitutional: Positive for fatigue  Negative for chills and fever  HENT: Negative for congestion, ear pain, hearing loss, rhinorrhea and sore throat  Eyes: Positive for visual disturbance  Negative for pain  Respiratory: Negative for cough, shortness of breath and wheezing  Cardiovascular: Positive for chest pain  Negative for palpitations and leg swelling  Gastrointestinal: Positive for abdominal pain, diarrhea, nausea and vomiting  Negative for blood in stool, constipation and rectal pain  Endocrine: Negative for cold intolerance, heat intolerance and polyuria  Genitourinary: Negative for difficulty urinating, dysuria, frequency, hematuria and urgency  Musculoskeletal: Negative for arthralgias, joint swelling and myalgias  Skin: Negative for rash and wound  Neurological: Positive for dizziness and headaches  Negative for syncope, weakness and numbness  Psychiatric/Behavioral: Positive for dysphoric mood and sleep disturbance  Negative for suicidal ideas  The patient is nervous/anxious            Objective:      /84 (BP Location: Left arm, Patient Position: Sitting, Cuff Size: Standard)   Pulse 101   Temp 98 7 °F (37 1 °C) (Tympanic)   Resp 18   Ht 5' 10" (1 778 m)   Wt 74 2 kg (163 lb 9 6 oz)   SpO2 97%   BMI 23 47 kg/m²          Physical Exam   Constitutional: He is oriented to person, place, and time  He appears well-developed and well-nourished  No distress  HENT:   Right Ear: Tympanic membrane, external ear and ear canal normal    Left Ear: Tympanic membrane, external ear and ear canal normal    Nose: Nose normal    Mouth/Throat: Oropharynx is clear and moist and mucous membranes are normal  No oropharyngeal exudate  Eyes: Conjunctivae and EOM are normal  Pupils are equal, round, and reactive to light  Neck: Normal range of motion  Neck supple  Cardiovascular: Normal rate, regular rhythm, normal heart sounds and normal pulses  Exam reveals no gallop and no friction rub  No murmur heard  Pulmonary/Chest: Effort normal and breath sounds normal  No respiratory distress  He has no wheezes  He has no rales  Abdominal: Soft  Bowel sounds are normal  He exhibits no distension and no mass  There is tenderness in the right lower quadrant  There is no rigidity, no rebound, no guarding, no CVA tenderness, no tenderness at McBurney's point and negative Orellana's sign  No hernia  Musculoskeletal: Normal range of motion  He exhibits no edema  Lymphadenopathy:     He has no cervical adenopathy  Neurological: He is alert and oriented to person, place, and time  He has normal strength and normal reflexes  No cranial nerve deficit  Skin: Skin is warm and dry  No rash noted  He is not diaphoretic  Psychiatric: His speech is normal and behavior is normal  Thought content normal  He exhibits a depressed mood  Nursing note and vitals reviewed

## 2018-03-27 ENCOUNTER — TELEPHONE (OUTPATIENT)
Dept: FAMILY MEDICINE CLINIC | Facility: CLINIC | Age: 38
End: 2018-03-27

## 2018-03-28 NOTE — TELEPHONE ENCOUNTER
Note is ready for pickup  The patient has concerns about possible side effects of medications we can switch to a different medication

## 2018-04-19 ENCOUNTER — OFFICE VISIT (OUTPATIENT)
Dept: FAMILY MEDICINE CLINIC | Facility: CLINIC | Age: 38
End: 2018-04-19
Payer: COMMERCIAL

## 2018-04-19 VITALS
HEIGHT: 70 IN | WEIGHT: 178.1 LBS | RESPIRATION RATE: 18 BRPM | HEART RATE: 78 BPM | SYSTOLIC BLOOD PRESSURE: 110 MMHG | TEMPERATURE: 96.7 F | OXYGEN SATURATION: 98 % | BODY MASS INDEX: 25.5 KG/M2 | DIASTOLIC BLOOD PRESSURE: 60 MMHG

## 2018-04-19 DIAGNOSIS — F41.9 ANXIETY AND DEPRESSION: ICD-10-CM

## 2018-04-19 DIAGNOSIS — R31.21 ASYMPTOMATIC MICROSCOPIC HEMATURIA: Primary | ICD-10-CM

## 2018-04-19 DIAGNOSIS — F32.A ANXIETY AND DEPRESSION: ICD-10-CM

## 2018-04-19 DIAGNOSIS — K58.0 IRRITABLE BOWEL SYNDROME WITH DIARRHEA: ICD-10-CM

## 2018-04-19 LAB
SL AMB  POCT GLUCOSE, UA: NEGATIVE
SL AMB LEUKOCYTE ESTERASE,UA: NEGATIVE
SL AMB POCT BILIRUBIN,UA: NEGATIVE
SL AMB POCT BLOOD,UA: ABNORMAL
SL AMB POCT CLARITY,UA: ABNORMAL
SL AMB POCT COLOR,UA: YELLOW
SL AMB POCT KETONES,UA: NEGATIVE
SL AMB POCT NITRITE,UA: NEGATIVE
SL AMB POCT PH,UA: 6.5
SL AMB POCT SPECIFIC GRAVITY,UA: 1.01
SL AMB POCT URINE PROTEIN: NEGATIVE
SL AMB POCT UROBILINOGEN: 0.2

## 2018-04-19 PROCEDURE — 81002 URINALYSIS NONAUTO W/O SCOPE: CPT | Performed by: PHYSICIAN ASSISTANT

## 2018-04-19 PROCEDURE — 87086 URINE CULTURE/COLONY COUNT: CPT | Performed by: PHYSICIAN ASSISTANT

## 2018-04-19 PROCEDURE — 99051 MED SERV EVE/WKEND/HOLIDAY: CPT | Performed by: PHYSICIAN ASSISTANT

## 2018-04-19 PROCEDURE — 3008F BODY MASS INDEX DOCD: CPT | Performed by: PHYSICIAN ASSISTANT

## 2018-04-19 PROCEDURE — 99213 OFFICE O/P EST LOW 20 MIN: CPT | Performed by: PHYSICIAN ASSISTANT

## 2018-04-19 PROCEDURE — 81003 URINALYSIS AUTO W/O SCOPE: CPT | Performed by: PHYSICIAN ASSISTANT

## 2018-04-19 NOTE — PROGRESS NOTES
Assessment/Plan:    Asymptomatic microscopic hematuria  Discussed with patient that there could be multiple causes for his microscopic hematuria  One of which could be the chemicals that he works with  Because he also had trace blood in his urine back in February will order ultrasound of kidney and bladder to check for possible stones or other causes of a blood  If this comes back normal, may refer to Urology for further workup  Anxiety and depression  Stable  Continue with mirtazapine  Irritable bowel syndrome with diarrhea  Stable  Continue with Bentyl  Diagnoses and all orders for this visit:    Asymptomatic microscopic hematuria  -     US kidney and bladder; Future  -     POCT urine dip  -     UA (URINE) with reflex to Microscopic  -     Urine culture    Irritable bowel syndrome with diarrhea    Anxiety and depression          Subjective:      Patient ID: Miracle Levine is a 40 y o  male  69-year-old male presenting for follow-up of recent ED visit for vomiting and abdominal pain  Patient states that he woke up the morning of 04/16/2018 around 2 o'clock a m  experiencing episodes of vomiting  Patient tried to go back to sleep, but was frequently woken up due to the vomiting  He states he had about 10-12 episodes prior to going to the emergency room  Date checked lab work, and gave him fluids, and told him that it was most likely a viral gastritis  Patient states that since being discharged from the ED he has been feeling much better  Denies any symptoms today  Patient does have concerns that the informed him he has trace blood in his urine  Patient denies any dysuria, abnormal discharge, gross hematuria, flank pain, difficulty urinating, abdominal pain  Patient denies any history of kidney stones  Patient was never smoker  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, and works with a lot of strong chemicals  Medications prescribed for anxiety and irritable bowel have been working for patient  No concerns today  The following portions of the patient's history were reviewed and updated as appropriate:   He  has a past medical history of No known problems  He   Patient Active Problem List    Diagnosis Date Noted    Asymptomatic microscopic hematuria 04/20/2018    Irritable bowel syndrome with diarrhea 03/22/2018    Anxiety and depression 03/22/2018    Dizziness 03/22/2018     He  has a past surgical history that includes Tonsillectomy and Mouth surgery  His family history includes Heart attack in his mother; Hypertension in his mother; No Known Problems in his father  He  reports that he has been smoking Cigarettes  He has been smoking about 0 25 packs per day  He has never used smokeless tobacco  He reports that he drinks alcohol  He reports that he does not use drugs  Current Outpatient Prescriptions   Medication Sig Dispense Refill    cholecalciferol (VITAMIN D3) 1,000 units tablet Take 1,000 Units by mouth daily      dicyclomine (BENTYL) 10 mg capsule Take 1 capsule (10 mg total) by mouth 3 (three) times a day before meals 90 capsule 2    famotidine (PEPCID) 20 mg tablet Take 1 tablet (20 mg total) by mouth 2 (two) times a day 30 tablet 0    loperamide (IMODIUM A-D) 2 MG tablet Take 2 mg by mouth 4 (four) times a day      mirtazapine (REMERON) 15 mg tablet Take 1 tablet (15 mg total) by mouth daily at bedtime 30 tablet 2    vitamin A 10,000 units capsule Take 10,000 Units by mouth daily       No current facility-administered medications for this visit  He is allergic to keflex [cephalexin] and penicillins       Review of Systems   Constitutional: Negative for chills, fatigue and fever  HENT: Negative for congestion, ear pain, hearing loss, rhinorrhea and sore throat  Eyes: Negative for pain and visual disturbance  Respiratory: Negative for cough, shortness of breath and wheezing  Cardiovascular: Negative for chest pain, palpitations and leg swelling  Gastrointestinal: Negative for abdominal pain, blood in stool, constipation, diarrhea, nausea and vomiting  Endocrine: Negative for cold intolerance, heat intolerance and polyuria  Genitourinary: Positive for hematuria (Microscopic hematuria, denies gross hematuria)  Negative for difficulty urinating, dysuria, frequency and urgency  Musculoskeletal: Negative for arthralgias, joint swelling and myalgias  Skin: Negative for rash and wound  Neurological: Negative for dizziness, syncope, weakness, numbness and headaches  Psychiatric/Behavioral: Negative for dysphoric mood and sleep disturbance  The patient is not nervous/anxious  Objective:      /60 (BP Location: Right arm, Patient Position: Sitting, Cuff Size: Large)   Pulse 78   Temp (!) 96 7 °F (35 9 °C) (Tympanic)   Resp 18   Ht 5' 10" (1 778 m)   Wt 80 8 kg (178 lb 1 6 oz)   SpO2 98%   BMI 25 55 kg/m²          Physical Exam   Constitutional: He is oriented to person, place, and time  He appears well-developed and well-nourished  HENT:   Right Ear: Tympanic membrane, external ear and ear canal normal    Left Ear: Tympanic membrane, external ear and ear canal normal    Nose: Nose normal    Mouth/Throat: Oropharynx is clear and moist and mucous membranes are normal  No oropharyngeal exudate  Eyes: Conjunctivae and EOM are normal  Pupils are equal, round, and reactive to light  Neck: Normal range of motion  Neck supple  Cardiovascular: Normal rate, regular rhythm, normal heart sounds and normal pulses  Exam reveals no gallop and no friction rub  No murmur heard  Pulmonary/Chest: Effort normal and breath sounds normal  No respiratory distress  He has no wheezes  He has no rales  Abdominal: Soft  Bowel sounds are normal  He exhibits no distension and no mass  There is no tenderness  There is no rebound and no guarding  Musculoskeletal: Normal range of motion  He exhibits no edema     Lymphadenopathy:     He has no cervical adenopathy  Neurological: He is alert and oriented to person, place, and time  He has normal strength and normal reflexes  No cranial nerve deficit  Skin: Skin is warm and dry  No rash noted  Psychiatric: He has a normal mood and affect  His behavior is normal  Thought content normal    Nursing note and vitals reviewed

## 2018-04-20 PROBLEM — R31.21 ASYMPTOMATIC MICROSCOPIC HEMATURIA: Status: ACTIVE | Noted: 2018-04-20

## 2018-04-20 LAB
BILIRUB UR QL STRIP: NEGATIVE
CLARITY UR: CLEAR
COLOR UR: YELLOW
GLUCOSE UR STRIP-MCNC: NEGATIVE MG/DL
HGB UR QL STRIP.AUTO: NEGATIVE
KETONES UR STRIP-MCNC: NEGATIVE MG/DL
LEUKOCYTE ESTERASE UR QL STRIP: NEGATIVE
NITRITE UR QL STRIP: NEGATIVE
PH UR STRIP.AUTO: 6.5 [PH] (ref 4.5–8)
PROT UR STRIP-MCNC: NEGATIVE MG/DL
SP GR UR STRIP.AUTO: 1.02 (ref 1–1.03)
UROBILINOGEN UR QL STRIP.AUTO: 0.2 E.U./DL

## 2018-04-20 NOTE — ASSESSMENT & PLAN NOTE
Discussed with patient that there could be multiple causes for his microscopic hematuria  One of which could be the chemicals that he works with  Because he also had trace blood in his urine back in February will order ultrasound of kidney and bladder to check for possible stones or other causes of a blood  If this comes back normal, may refer to Urology for further workup

## 2018-04-21 LAB — BACTERIA UR CULT: NORMAL

## 2018-04-23 ENCOUNTER — HOSPITAL ENCOUNTER (OUTPATIENT)
Dept: ULTRASOUND IMAGING | Facility: HOSPITAL | Age: 38
Discharge: HOME | End: 2018-04-23
Payer: COMMERCIAL

## 2018-04-23 DIAGNOSIS — R31.21 ASYMPTOMATIC MICROSCOPIC HEMATURIA: ICD-10-CM

## 2018-04-23 PROCEDURE — 76770 US EXAM ABDO BACK WALL COMP: CPT

## 2018-05-22 ENCOUNTER — TELEPHONE (OUTPATIENT)
Dept: FAMILY MEDICINE CLINIC | Facility: CLINIC | Age: 38
End: 2018-05-22

## 2018-07-31 ENCOUNTER — TELEPHONE (OUTPATIENT)
Dept: FAMILY MEDICINE CLINIC | Facility: CLINIC | Age: 38
End: 2018-07-31

## 2018-07-31 NOTE — TELEPHONE ENCOUNTER
Holter monitor showed several episodes of early contractions of the heart  EKG was normal when there was reported episodes of chest pain

## 2018-07-31 NOTE — TELEPHONE ENCOUNTER
Pt called and said [address] told him to get his Halter Monitor results from his PCP-     Please advise what results show and if an MA or I can give him the results?

## 2018-08-13 NOTE — TELEPHONE ENCOUNTER
Can clinical please provide these results to PT?   Pt  Will want explaintion of "Episodes of Early Contractions" - Thank you

## 2018-09-18 ENCOUNTER — TELEPHONE (OUTPATIENT)
Dept: FAMILY MEDICINE CLINIC | Facility: CLINIC | Age: 38
End: 2018-09-18

## 2018-09-18 NOTE — TELEPHONE ENCOUNTER
Pt  Called stating he received a NO SHOW Letter for a no show appt  9/6/18  He said he cancelled the appt  via [address] and doesn't understand why he still received the no show letter  He asked I find out what to do? And to note this on his chart

## 2018-09-19 ENCOUNTER — TELEPHONE (OUTPATIENT)
Dept: FAMILY MEDICINE CLINIC | Facility: CLINIC | Age: 38
End: 2018-09-19

## 2019-01-07 ENCOUNTER — TELEPHONE (OUTPATIENT)
Dept: OTHER | Facility: OTHER | Age: 39
End: 2019-01-07

## 2019-01-07 ENCOUNTER — OFFICE VISIT (OUTPATIENT)
Dept: FAMILY MEDICINE CLINIC | Facility: CLINIC | Age: 39
End: 2019-01-07

## 2019-01-07 VITALS
OXYGEN SATURATION: 100 % | WEIGHT: 175.13 LBS | HEIGHT: 70 IN | TEMPERATURE: 97.5 F | DIASTOLIC BLOOD PRESSURE: 80 MMHG | SYSTOLIC BLOOD PRESSURE: 124 MMHG | HEART RATE: 95 BPM | RESPIRATION RATE: 18 BRPM | BODY MASS INDEX: 25.07 KG/M2

## 2019-01-07 DIAGNOSIS — K58.0 IRRITABLE BOWEL SYNDROME WITH DIARRHEA: Primary | ICD-10-CM

## 2019-01-07 DIAGNOSIS — F32.A ANXIETY AND DEPRESSION: ICD-10-CM

## 2019-01-07 DIAGNOSIS — F41.9 ANXIETY AND DEPRESSION: ICD-10-CM

## 2019-01-07 DIAGNOSIS — R19.7 DIARRHEA, UNSPECIFIED TYPE: ICD-10-CM

## 2019-01-07 DIAGNOSIS — R19.5 HEME POSITIVE STOOL: ICD-10-CM

## 2019-01-07 PROCEDURE — 99214 OFFICE O/P EST MOD 30 MIN: CPT | Performed by: PHYSICIAN ASSISTANT

## 2019-01-07 PROCEDURE — 3008F BODY MASS INDEX DOCD: CPT | Performed by: PHYSICIAN ASSISTANT

## 2019-01-07 NOTE — TELEPHONE ENCOUNTER
Evert Pulido 1980  CONFIDENTIALTY NOTICE: This fax transmission is intended only for the addressee  It contains information that is legally privileged,  confidential or otherwise protected from use or disclosure  If you are not the intended recipient, you are strictly prohibited from reviewing,  disclosing, copying using or disseminating any of this information or taking any action in reliance on or regarding this information  If you have  received this fax in error, please notify us immediately by telephone so that we can arrange for its return to us  Page: 1 of 2  Call Id: [id number]  Health Call  Standard Call Report  Health Call  Patient Name: Evert Pulido  Gender: Male  : 1980  Age: 41 Y 4 M 7 D  Return Phone  Number: [phone] (Cell)  Address: [place] [address]  City/State/Zip: [address] [place] [address]  Practice Name: [address] [place] [address]  Practice Charged:  Physician:  [facility] Name:  Relationship To  Patient: Self  Return Phone Number: [phone] (Cell)  Presenting Problem: "I am vomiting, blood stool in stool  with diarrhea and dehydrated"  Service Type: Triage  Charged Service 1: N/A  Pharmacy Name and  Number:  Nurse Assessment  Nurse: Kassi Arshad Date/Time: 2019 5:23:08 AM  Type of assessment required:  ---General (Adult or Child)  Duration of Current S/S  ---Few days  Location/Radiation  ---GI  Temperature (F) and route:  ---" I feel like I have one "  Symptom Specific Meds (Dose/Time):  ---None  Other S/S  ---Known IBS  Seen in ED multiple times  Stool is green and has mucous  Some flecks  of blood noted  No vomiting  Pain Scale on scale of 1-10, 10 being the worst:  ---Intermittent abdominal pain  Symptom progression:  ---worse  Intake and Output  ---Drinking ginger ale/ Stated he has urinated a few times  Evert Pulido 1980  CONFIDENTIALTY NOTICE: This fax transmission is intended only for the addressee   It contains information that is legally privileged,  confidential or otherwise protected from use or disclosure  If you are not the intended recipient, you are strictly prohibited from reviewing,  disclosing, copying using or disseminating any of this information or taking any action in reliance on or regarding this information  If you have  received this fax in error, please notify us immediately by telephone so that we can arrange for its return to us  Page: 2 of 2  Call Id: [id number]  Nurse Assessment  Last Exam/Treatment:  ---Seen in ED twice  Protocols  Protocol Title Nurse Date/Time  Disp  Time Disposition Final User  1/7/2019 5:33:04 AM Close Yes Felipe Sidhu  Comments  User: Yakov Boyce Date/Time: 1/7/2019 5:32:55 AM  Appt  made today in office to be evaluated

## 2019-01-07 NOTE — ASSESSMENT & PLAN NOTE
He was referred to GI  He has not seen them in the past   I do believe symtpoms now are an exacerbation as he states that this happened around the same time last year and his mother did pass away in January  Blood is likely from irritation   Recommend short term OTC immodium

## 2019-01-07 NOTE — PATIENT INSTRUCTIONS
Irritable Bowel Syndrome   AMBULATORY CARE:   Irritable bowel syndrome (IBS)  is a condition that prevents food from moving through your intestines normally  The food may move through too slowly or too quickly  This causes bloating, increased gas, constipation, or diarrhea  Signs and symptoms of IBS may come and go  Symptoms can occur a few times a week to once a month  IBS can go away for years and suddenly return  Your symptoms may worsen after you eat a big meal or if you do not eat enough healthy foods  Common symptoms include the following:   · Abdominal pain that disappears after you have a bowel movement    · Abdominal cramps that are worse after you eat    · Gas    · Bloated abdomen    · Diarrhea, constipation, or both     · Feeling like you need to have a bowel movement after you just had one    · Mucus in your bowel movement    · Feeling that you have not completely emptied your bowels after a bowel movement  Seek care immediately if:   · You have severe abdominal pain  · Your bowel movements are dark or have blood in them  Contact your healthcare provider if:   · You have a fever  · You have pain in your rectum  · Your abdominal pain does not go away, even after treatment  · You have questions or concerns about your condition or care  Treatment for IBS  may include medicine to decrease diarrhea or soften your bowel movements  You may also need medicine to treat constipation or decrease abdominal pain and muscle spasms  Manage your symptoms:   · Eat a variety of healthy foods  Healthy foods include fruits, vegetables, whole-grain breads, low-fat dairy products, beans, lean meats, and fish  You may need to avoid certain foods to decrease your symptoms  · Drink liquids as directed  Ask how much liquid to drink each day and which liquids are best for you  For most people, good liquids to drink are water, juice, and milk  · Exercise regularly  Ask about the best exercise plan for you  Exercise can decrease your blood pressure and improve your health  · Manage stress  Stress may slow healing and cause illness  Learn new ways to relax, such as deep breathing  · Keep a record  of everything you eat and drink, and your symptoms, for 3 weeks  Bring this record with you to your follow-up visits  Follow up with your healthcare provider as directed:  Write down your questions so you remember to ask them during your visits  © 2017 [employer] Information is for End User's use only and may not be sold, redistributed or otherwise used for commercial purposes  All illustrations and images included in CareNotes® are the copyrighted property of [employer]  or Stanley Donohue  The above information is an  only  It is not intended as medical advice for individual conditions or treatments  Talk to your doctor, nurse or pharmacist before following any medical regimen to see if it is safe and effective for you

## 2019-01-07 NOTE — LETTER
January 7, 2019     Patient: Cristobal Asencio   YOB: 1980   Date of Visit: 1/7/2019       To Whom it May Concern:    Cristobal Asencio is under my professional care  He was seen in my office on 1/7/2019  He may return to work on 1/8/19  If you have any questions or concerns, please don't hesitate to call           Sincerely,          Soledad Gutierres PA-C        CC: No Recipients

## 2019-01-07 NOTE — PROGRESS NOTES
Assessment/Plan:    Irritable bowel syndrome with diarrhea  He was referred to GI  He has not seen them in the past   I do believe symtpoms now are an exacerbation as he states that this happened around the same time last year and his mother did pass away in January  Blood is likely from irritation  Recommend short term OTC immodium    Anxiety and depression  Recommend psychiatry  He has been on medications in the past and since he works as a  and has e         Problem List Items Addressed This Visit        Digestive    Irritable bowel syndrome with diarrhea - Primary     He was referred to GI  He has not seen them in the past   I do believe symtpoms now are an exacerbation as he states that this happened around the same time last year and his mother did pass away in January  Blood is likely from irritation  Recommend short term OTC immodium         Relevant Orders    Ambulatory referral to Gastroenterology       Other    Anxiety and depression     Recommend psychiatry  He has been on medications in the past and since he works as a  and has e         Relevant Orders    Ambulatory referral to Psychiatry      Other Visit Diagnoses     Heme positive stool        Relevant Orders    Ambulatory referral to Gastroenterology    Diarrhea, unspecified type        Relevant Orders    Ambulatory referral to Gastroenterology            Subjective:      Patient ID: Cristobal Asencio is a 41 y o  male  HPI  40 y/o M here for follow up from the ED on 1/2 and 1/2/19 at [facility] for vomiting and diarrhea  ON 1/2 he was given GI cocktail, IVF  Other than proteinuria labs were unremarkable  On 1/4 he developed lower abdomen pain again  He was taking zofran PRN from prior ED visit  UA this time unremarkable  He rested this weekend  He has been having intermittent diarrhea last night  He had bubbly green stool with blood specks in stool  No abdomen pain right now but he is bloated    He does have IBS and was given bentyl but he is not using it now or anything because he was told he does not want to stop the diarrhea  He has gone more than 6 times in the last 24 hours  Abdomen pain is improved  He does have a history of anxiety and when sytmpoms occurred last year of IBS it was this time of year  He has had a lot of family members that passed away in last few years  Mood has been anxious and depressed  He was on mirtazapine but he was feeling too tired and other meds have made him dizzy  The following portions of the patient's history were reviewed and updated as appropriate:   He  has a past medical history of No known problems  He   Patient Active Problem List    Diagnosis Date Noted    Asymptomatic microscopic hematuria 04/20/2018    Irritable bowel syndrome with diarrhea 03/22/2018    Anxiety and depression 03/22/2018    Dizziness 03/22/2018     He  has a past surgical history that includes Tonsillectomy and Mouth surgery  His family history includes Heart attack in his mother; Hypertension in his mother; No Known Problems in his father  He  reports that he has been smoking Cigarettes  He has been smoking about 0 25 packs per day  He has never used smokeless tobacco  He reports that he drinks alcohol  He reports that he does not use drugs  Current Outpatient Prescriptions   Medication Sig Dispense Refill    cholecalciferol (VITAMIN D3) 1,000 units tablet Take 1,000 Units by mouth daily      dicyclomine (BENTYL) 10 mg capsule Take 1 capsule (10 mg total) by mouth 3 (three) times a day before meals 90 capsule 2    loperamide (IMODIUM A-D) 2 MG tablet Take 2 mg by mouth 4 (four) times a day      vitamin A 10,000 units capsule Take 10,000 Units by mouth daily       No current facility-administered medications for this visit        Current Outpatient Prescriptions on File Prior to Visit   Medication Sig    cholecalciferol (VITAMIN D3) 1,000 units tablet Take 1,000 Units by mouth daily    dicyclomine (BENTYL) 10 mg capsule Take 1 capsule (10 mg total) by mouth 3 (three) times a day before meals    loperamide (IMODIUM A-D) 2 MG tablet Take 2 mg by mouth 4 (four) times a day    vitamin A 10,000 units capsule Take 10,000 Units by mouth daily    [DISCONTINUED] famotidine (PEPCID) 20 mg tablet Take 1 tablet (20 mg total) by mouth 2 (two) times a day    [DISCONTINUED] mirtazapine (REMERON) 15 mg tablet Take 1 tablet (15 mg total) by mouth daily at bedtime     No current facility-administered medications on file prior to visit  He is allergic to keflex [cephalexin] and penicillins       Review of Systems      Objective:      /80 (BP Location: Right arm, Patient Position: Sitting, Cuff Size: Standard)   Pulse 95   Temp 97 5 °F (36 4 °C) (Tympanic)   Resp 18   Ht 5' 10" (1 778 m)   Wt 79 4 kg (175 lb 2 oz)   SpO2 100%   BMI 25 13 kg/m²          Physical Exam   Constitutional: He is oriented to person, place, and time  He appears well-developed and well-nourished  HENT:   Head: Normocephalic and atraumatic  Right Ear: External ear normal    Left Ear: External ear normal    Nose: Nose normal    Mouth/Throat: Oropharynx is clear and moist    Eyes: Conjunctivae are normal    Neck: Normal range of motion  Neck supple  No thyromegaly present  Cardiovascular: Normal rate, regular rhythm and normal heart sounds  No murmur heard  Pulmonary/Chest: Effort normal and breath sounds normal  No respiratory distress  Abdominal: Soft  Bowel sounds are normal  He exhibits no mass  There is no tenderness  There is no guarding  Normal rectal exam but  heme positive stool   Musculoskeletal: Normal range of motion  Lymphadenopathy:     He has no cervical adenopathy  Neurological: He is alert and oriented to person, place, and time  Skin: Skin is warm  Psychiatric:   Flat affect   Nursing note and vitals reviewed

## 2019-02-06 ENCOUNTER — HOSPITAL ENCOUNTER (EMERGENCY)
Facility: HOSPITAL | Age: 39
Discharge: HOME | End: 2019-02-06
Attending: EMERGENCY MEDICINE
Payer: COMMERCIAL

## 2019-02-06 VITALS
SYSTOLIC BLOOD PRESSURE: 150 MMHG | BODY MASS INDEX: 25.11 KG/M2 | OXYGEN SATURATION: 97 % | RESPIRATION RATE: 16 BRPM | WEIGHT: 175 LBS | HEART RATE: 85 BPM | TEMPERATURE: 97.6 F | DIASTOLIC BLOOD PRESSURE: 75 MMHG

## 2019-02-06 DIAGNOSIS — S40.022A ARM BRUISE, LEFT, INITIAL ENCOUNTER: ICD-10-CM

## 2019-02-06 DIAGNOSIS — R11.2 NAUSEA AND VOMITING: Primary | ICD-10-CM

## 2019-02-06 PROCEDURE — 99283 EMERGENCY DEPT VISIT LOW MDM: CPT

## 2019-02-06 RX ORDER — ONDANSETRON 4 MG/1
4 TABLET, ORALLY DISINTEGRATING ORAL ONCE
Status: COMPLETED | OUTPATIENT
Start: 2019-02-06 | End: 2019-02-06

## 2019-02-06 RX ORDER — ONDANSETRON 4 MG/1
4 TABLET, FILM COATED ORAL EVERY 8 HOURS PRN
Qty: 10 TABLET | Refills: 0 | Status: SHIPPED | OUTPATIENT
Start: 2019-02-06

## 2019-02-06 RX ORDER — ACETAMINOPHEN 325 MG/1
650 TABLET ORAL EVERY 6 HOURS PRN
Status: DISCONTINUED | OUTPATIENT
Start: 2019-02-06 | End: 2019-02-06 | Stop reason: HOSPADM

## 2019-02-06 RX ADMIN — ONDANSETRON 4 MG: 4 TABLET, ORALLY DISINTEGRATING ORAL at 13:10

## 2019-02-06 RX ADMIN — ACETAMINOPHEN 650 MG: 325 TABLET, FILM COATED ORAL at 13:25

## 2019-02-06 NOTE — DISCHARGE INSTRUCTIONS
Acute Nausea and Vomiting   WHAT YOU NEED TO KNOW:   Acute nausea and vomiting start suddenly, worsen quickly, and last a short time  DISCHARGE INSTRUCTIONS:   Return to the emergency department if:   · You see blood in your vomit or your bowel movements  · You have sudden, severe pain in your chest and upper abdomen after hard vomiting or retching  · You have swelling in your neck and chest      · You are dizzy, cold, and thirsty and your eyes and mouth are dry  · You are urinating very little or not at all  · You have muscle weakness, leg cramps, and trouble breathing  · Your heart is beating much faster than normal      · You continue to vomit for more than 48 hours  Contact your healthcare provider if:   · You have frequent dry heaves (vomiting but nothing comes out)  · Your nausea and vomiting does not get better or go away after you use medicine  · You have questions or concerns about your condition or treatment  Medicines: You may need any of the following:  · Medicines  may be given to calm your stomach and stop your vomiting  You may also need medicines to help you feel more relaxed or to stop nausea and vomiting caused by motion sickness  · Gastrointestinal stimulants  are used to help empty your stomach and bowels  This may help decrease nausea and vomiting  · Take your medicine as directed  Contact your healthcare provider if you think your medicine is not helping or if you have side effects  Tell him or her if you are allergic to any medicine  Keep a list of the medicines, vitamins, and herbs you take  Include the amounts, and when and why you take them  Bring the list or the pill bottles to follow-up visits  Carry your medicine list with you in case of an emergency  Prevent or manage acute nausea and vomiting:   · Do not drink alcohol  Alcohol may upset or irritate your stomach  Too much alcohol can also cause acute nausea and vomiting  · Control stress    Headaches due to stress may cause nausea and vomiting  Find ways to relax and manage your stress  Get more rest and sleep  · Drink more liquids as directed  Vomiting can lead to dehydration  It is important to drink more liquids to help replace lost body fluids  Ask your healthcare provider how much liquid to drink each day and which liquids are best for you  Your provider may recommend that you drink an oral rehydration solution (ORS)  ORS contains water, salts, and sugar that are needed to replace the lost body fluids  Ask what kind of ORS to use, how much to drink, and where to get it  · Eat smaller meals, more often  Eat small amounts of food every 2 to 3 hours, even if you are not hungry  Food in your stomach may decrease your nausea  · Talk to your healthcare provider before you take over-the-counter (OTC) medicines  These medicines can cause serious problems if you use certain other medicines, or you have a medical condition  You may have problems if you use too much or use them for longer than the label says  Follow directions on the label carefully  Follow up with your healthcare provider as directed:  Write down your questions so you remember to ask them during your follow-up visits  © 2017 [employer] Information is for End User's use only and may not be sold, redistributed or otherwise used for commercial purposes  All illustrations and images included in CareNotes® are the copyrighted property of [employer]  or Stanley Donohue  The above information is an  only  It is not intended as medical advice for individual conditions or treatments  Talk to your doctor, nurse or pharmacist before following any medical regimen to see if it is safe and effective for you

## 2019-02-06 NOTE — ED PROVIDER NOTES
History  Chief Complaint   Patient presents with    Sore Throat     pt c/o sore throat that started this morning after vomiting x1 this morning, also have a headache, also will like to get his left arm check after got blood taken on friday feb 1st "I'm concern that the arm looks so bruise"     Patient is a 56-year-old male with past medical history of IBS who presents for evaluation of vomiting and sore throat  He states that this morning he woke up and had 1 episode of vomiting  The emesis was food and clear liquid  There was no blood or bile in the emesis  He states that after he threw up he started with a sore scratchy throat  He states that he did eat [employer] last night and this possibly upset his stomach  He also notes that there have been many sick contacts with same at his job  He has not eaten today after vomiting  He states he has chronic diarrhea with his IBS and this has not changed or increased (he states he is being worked up for this by his PCP and GI)  He denies any fever, chills, chest pain, shortness breath, abdominal pain, ear pain, cough, dysuria, hematuria  He also states that he has a bruise to his left arm where he had blood work drawn about 5 days ago  He states initially it was black and blue but it has been getting better and now just is a faint yellow color  He just wanted to get this checked out  He denies any erythema, warmth, significant pain, streaking, numbness, weakness, swelling or lumps  Prior to Admission Medications   Prescriptions Last Dose Informant Patient Reported?  Taking?   dicyclomine (BENTYL) 10 mg capsule   No Yes   Sig: Take 1 capsule (10 mg total) by mouth 3 (three) times a day before meals   loperamide (IMODIUM A-D) 2 MG tablet   Yes Yes   Sig: Take 2 mg by mouth 4 (four) times a day   psyllium (METAMUCIL) 58 6 % packet   Yes Yes   Sig: Take 1 packet by mouth daily      Facility-Administered Medications: None       Past Medical History: Diagnosis Date    No known problems        Past Surgical History:   Procedure Laterality Date    MOUTH SURGERY      extractions    TONSILLECTOMY         Family History   Problem Relation Age of Onset    Hypertension Mother     Heart attack Mother     No Known Problems Father      I have reviewed and agree with the history as documented  Social History   Substance Use Topics    Smoking status: Current Every Day Smoker     Packs/day: 0 25     Types: Cigarettes    Smokeless tobacco: Never Used    Alcohol use Yes      Comment: occassionally        Review of Systems   Constitutional: Negative for chills and fever  HENT: Positive for sore throat  Negative for congestion and ear pain  Respiratory: Negative for cough and shortness of breath  Cardiovascular: Negative for chest pain  Gastrointestinal: Positive for diarrhea (chronic, no change), nausea and vomiting  Negative for abdominal pain  Genitourinary: Negative for dysuria, frequency and hematuria  Musculoskeletal: Negative for neck pain and neck stiffness  Skin: Negative for rash  All other systems reviewed and are negative  Physical Exam  Physical Exam   Constitutional: He is oriented to person, place, and time  He appears well-developed and well-nourished  No distress  HENT:   Head: Normocephalic and atraumatic  Right Ear: External ear normal    Left Ear: External ear normal    Nose: Nose normal    Mouth/Throat: Oropharynx is clear and moist  No oropharyngeal exudate  Eyes: Conjunctivae are normal    Neck: Normal range of motion  Neck supple  Cardiovascular: Normal rate, regular rhythm and normal heart sounds  Exam reveals no gallop and no friction rub  No murmur heard  Pulmonary/Chest: Effort normal and breath sounds normal  No respiratory distress  He has no wheezes  He has no rales  Abdominal: Soft  Bowel sounds are normal  He exhibits no distension  There is no tenderness  There is no guarding  Musculoskeletal: Normal range of motion  Neurological: He is alert and oriented to person, place, and time  Skin: Skin is warm and dry  He is not diaphoretic  Left AC with fading yellow bruise noted from prior blood draw  No erythema, swelling, warmth  No tenderness  No palpable cord or mass  No fluctuance or induration  No sign of streaking or lymphangitis  FROM of joint  Radial pulse intact  NVI distally   strength intact  Psychiatric: He has a normal mood and affect  His behavior is normal    Nursing note and vitals reviewed  Vital Signs  ED Triage Vitals [02/06/19 1215]   Temperature Pulse Respirations Blood Pressure SpO2   97 6 °F (36 4 °C) 85 16 150/75 97 %      Temp Source Heart Rate Source Patient Position - Orthostatic VS BP Location FiO2 (%)   Oral Monitor Sitting Right arm --      Pain Score       8           Vitals:    02/06/19 1215   BP: 150/75   Pulse: 85   Patient Position - Orthostatic VS: Sitting       Visual Acuity      ED Medications  Medications   ondansetron (ZOFRAN-ODT) dispersible tablet 4 mg (4 mg Oral Given 2/6/19 1310)       Diagnostic Studies  Results Reviewed     None                 No orders to display              Procedures  Procedures       Phone Contacts  ED Phone Contact    ED Course                               MDM  Number of Diagnoses or Management Options  Diagnosis management comments: Patient with one episode of Non bloody non bilious vomiting this morning  He ate [employer] last night and does admit to multiple sick contacts at work with similar  He started with a scratchy sore throat after vomiting  No other symptoms or complaints  He does have IBS and has had chronic diarrhea for years, no change and he is being evaluated for this  He denies fever, urinary complaints, or abdominal pain  No further vomiting  Plan- will give zofran and tylenol  Will PO challenge  Patient able to handle tylenol and PO after zofran     Will give short course of zofran for home for persistent nausea and or vomiting  Instructed to follow up with PCP in 1 day  Return to the ED if symptoms worsen or new symptoms arise  Patient states understanding and agrees with plan  Patient Progress  Patient progress: stable      Disposition  Final diagnoses:   Nausea and vomiting   Arm bruise, left, initial encounter     Time reflects when diagnosis was documented in both MDM as applicable and the Disposition within this note     Time User Action Codes Description Comment    2/6/2019  1:58 PM Vanesa Fuentes Add [R11 2] Nausea and vomiting     2/6/2019  1:59 PM Vanesa Fuentes Add [S40 022A] Arm bruise, left, initial encounter       ED Disposition     ED Disposition Condition Date/Time Comment    Discharge  Wed Feb 6, 2019  1:59 PM Arely Breen discharge to home/self care      Condition at discharge: Good        Follow-up Information     Follow up With Specialties Details Why Contact Info Additional Information    Dee Phoenix PA-C Family Medicine, Physician Assistant Schedule an appointment as soon as possible for a visit in 1 day  [address] [phone]       With your GI doctor as scheduled          [address] [facility] Emergency Department Emergency Medicine  If symptoms worsen or new symptoms arise as discussed  Elo Bailey [address] [facility] ED, [address] [place] [address], [id number]          Discharge Medication List as of 2/6/2019  2:00 PM      START taking these medications    Details   ondansetron (ZOFRAN) 4 mg tablet Take 1 tablet (4 mg total) by mouth every 8 (eight) hours as needed for nausea or vomiting, Starting Wed 2/6/2019, Print         CONTINUE these medications which have NOT CHANGED    Details   dicyclomine (BENTYL) 10 mg capsule Take 1 capsule (10 mg total) by mouth 3 (three) times a day before meals, Starting u 3/22/2018, Normal      loperamide (IMODIUM A-D) 2 MG tablet Take 2 mg by mouth 4 (four) times a day, Starting Tue 3/13/2018, Historical Med      psyllium (METAMUCIL) 58 6 % packet Take 1 packet by mouth daily, Historical Med           No discharge procedures on file      ED Provider  Electronically Signed by           Selene Mortensen PA-C  02/06/19 9934

## 2019-02-07 ENCOUNTER — TELEPHONE (OUTPATIENT)
Dept: OTHER | Facility: OTHER | Age: 39
End: 2019-02-07

## 2019-02-08 DIAGNOSIS — F41.9 ANXIETY AND DEPRESSION: Primary | ICD-10-CM

## 2019-02-08 DIAGNOSIS — F32.A ANXIETY AND DEPRESSION: Primary | ICD-10-CM

## 2019-02-08 NOTE — TELEPHONE ENCOUNTER
1st phone call - spoke with Mary Avila [facility]   Pt refusing to provide demographic information, [facility]  [facility]x [facility]  2nd call - spoke with RN  Pt upset that he did not receive phone call from Justin Robertson today  Would like JING Matos PA-C to be made aware  Still feeling poorly with abdominal pain  Declines triage services  "I just want the GI office to call me back "     Pt crying during telephone conversation  Offered to connect with [facility] [place]  Pt refused, stating "I'm just gonna call my brother " Terminated call

## 2019-02-08 NOTE — TELEPHONE ENCOUNTER
Patient had an appointment scheduled for 02/12/2019, but switched to 02/21/2019  Will have our  reach out to patient prior to appointment

## 2019-02-09 ENCOUNTER — HOSPITAL ENCOUNTER (EMERGENCY)
Facility: HOSPITAL | Age: 39
Discharge: HOME | End: 2019-02-09
Attending: EMERGENCY MEDICINE | Admitting: EMERGENCY MEDICINE
Payer: COMMERCIAL

## 2019-02-09 VITALS
SYSTOLIC BLOOD PRESSURE: 110 MMHG | OXYGEN SATURATION: 100 % | TEMPERATURE: 98 F | RESPIRATION RATE: 18 BRPM | HEART RATE: 64 BPM | DIASTOLIC BLOOD PRESSURE: 61 MMHG

## 2019-02-09 DIAGNOSIS — R10.9 ABDOMINAL PAIN: ICD-10-CM

## 2019-02-09 DIAGNOSIS — R19.7 DIARRHEA: Primary | ICD-10-CM

## 2019-02-09 LAB
ANION GAP SERPL CALCULATED.3IONS-SCNC: 10 MMOL/L (ref 4–13)
BASOPHILS # BLD AUTO: 0.06 THOUSANDS/ΜL (ref 0–0.1)
BASOPHILS NFR BLD AUTO: 1 % (ref 0–1)
BILIRUB UR QL STRIP: NEGATIVE
BUN SERPL-MCNC: 9 MG/DL (ref 5–25)
CALCIUM SERPL-MCNC: 8.8 MG/DL (ref 8.3–10.1)
CHLORIDE SERPL-SCNC: 104 MMOL/L (ref 100–108)
CLARITY UR: CLEAR
CO2 SERPL-SCNC: 26 MMOL/L (ref 21–32)
COLOR UR: YELLOW
CREAT SERPL-MCNC: 0.73 MG/DL (ref 0.6–1.3)
EOSINOPHIL # BLD AUTO: 0.05 THOUSAND/ΜL (ref 0–0.61)
EOSINOPHIL NFR BLD AUTO: 1 % (ref 0–6)
ERYTHROCYTE [DISTWIDTH] IN BLOOD BY AUTOMATED COUNT: 12.7 % (ref 11.6–15.1)
GFR SERPL CREATININE-BSD FRML MDRD: 118 ML/MIN/1.73SQ M
GLUCOSE SERPL-MCNC: 94 MG/DL (ref 65–140)
GLUCOSE UR STRIP-MCNC: NEGATIVE MG/DL
HCT VFR BLD AUTO: 39.9 % (ref 36.5–49.3)
HGB BLD-MCNC: 13.5 G/DL (ref 12–17)
HGB UR QL STRIP.AUTO: NEGATIVE
IMM GRANULOCYTES # BLD AUTO: 0.03 THOUSAND/UL (ref 0–0.2)
IMM GRANULOCYTES NFR BLD AUTO: 0 % (ref 0–2)
KETONES UR STRIP-MCNC: NEGATIVE MG/DL
LEUKOCYTE ESTERASE UR QL STRIP: NEGATIVE
LIPASE SERPL-CCNC: 85 U/L (ref 73–393)
LYMPHOCYTES # BLD AUTO: 1.49 THOUSANDS/ΜL (ref 0.6–4.47)
LYMPHOCYTES NFR BLD AUTO: 15 % (ref 14–44)
MCH RBC QN AUTO: 30.8 PG (ref 26.8–34.3)
MCHC RBC AUTO-ENTMCNC: 33.8 G/DL (ref 31.4–37.4)
MCV RBC AUTO: 91 FL (ref 82–98)
MONOCYTES # BLD AUTO: 0.56 THOUSAND/ΜL (ref 0.17–1.22)
MONOCYTES NFR BLD AUTO: 6 % (ref 4–12)
NEUTROPHILS # BLD AUTO: 7.59 THOUSANDS/ΜL (ref 1.85–7.62)
NEUTS SEG NFR BLD AUTO: 77 % (ref 43–75)
NITRITE UR QL STRIP: NEGATIVE
NRBC BLD AUTO-RTO: 0 /100 WBCS
PH UR STRIP.AUTO: 7.5 [PH] (ref 4.5–8)
PLATELET # BLD AUTO: 288 THOUSANDS/UL (ref 149–390)
PMV BLD AUTO: 8.8 FL (ref 8.9–12.7)
POTASSIUM SERPL-SCNC: 3.9 MMOL/L (ref 3.5–5.3)
PROT UR STRIP-MCNC: NEGATIVE MG/DL
RBC # BLD AUTO: 4.38 MILLION/UL (ref 3.88–5.62)
SODIUM SERPL-SCNC: 140 MMOL/L (ref 136–145)
SP GR UR STRIP.AUTO: 1.01 (ref 1–1.03)
UROBILINOGEN UR QL STRIP.AUTO: 0.2 E.U./DL
WBC # BLD AUTO: 9.78 THOUSAND/UL (ref 4.31–10.16)

## 2019-02-09 PROCEDURE — 85025 COMPLETE CBC W/AUTO DIFF WBC: CPT | Performed by: EMERGENCY MEDICINE

## 2019-02-09 PROCEDURE — 96361 HYDRATE IV INFUSION ADD-ON: CPT

## 2019-02-09 PROCEDURE — 99284 EMERGENCY DEPT VISIT MOD MDM: CPT

## 2019-02-09 PROCEDURE — 81003 URINALYSIS AUTO W/O SCOPE: CPT | Performed by: EMERGENCY MEDICINE

## 2019-02-09 PROCEDURE — 80048 BASIC METABOLIC PNL TOTAL CA: CPT | Performed by: EMERGENCY MEDICINE

## 2019-02-09 PROCEDURE — 96374 THER/PROPH/DIAG INJ IV PUSH: CPT

## 2019-02-09 PROCEDURE — 83690 ASSAY OF LIPASE: CPT | Performed by: EMERGENCY MEDICINE

## 2019-02-09 PROCEDURE — 36415 COLL VENOUS BLD VENIPUNCTURE: CPT | Performed by: EMERGENCY MEDICINE

## 2019-02-09 RX ORDER — DIPHENOXYLATE HYDROCHLORIDE AND ATROPINE SULFATE 2.5; .025 MG/1; MG/1
1 TABLET ORAL 4 TIMES DAILY PRN
Qty: 10 TABLET | Refills: 0 | Status: SHIPPED | OUTPATIENT
Start: 2019-02-09 | End: 2019-02-19

## 2019-02-09 RX ORDER — KETOROLAC TROMETHAMINE 30 MG/ML
30 INJECTION, SOLUTION INTRAMUSCULAR; INTRAVENOUS ONCE
Status: COMPLETED | OUTPATIENT
Start: 2019-02-09 | End: 2019-02-09

## 2019-02-09 RX ADMIN — SODIUM CHLORIDE 1000 ML: 0.9 INJECTION, SOLUTION INTRAVENOUS at 13:46

## 2019-02-09 RX ADMIN — KETOROLAC TROMETHAMINE 30 MG: 30 INJECTION, SOLUTION INTRAMUSCULAR at 13:45

## 2019-02-09 NOTE — ED PROVIDER NOTES
History  Chief Complaint   Patient presents with    Abdominal Pain     seen in ED a few days ago for similar complaints, has had diarrhea for the past two nights, lower abdomen pain, denies vomiting  52-year-old male with a history of irritable bowel disease presents to the emergency department with a 3 day history of multiple episodes of nonbloody, watery diarrhea  He was seen here on February 6 for nausea and vomiting and also diarrhea, however he states nausea and vomiting has resolved  Complains of abdominal cramping  No fevers or chills  He has seen a gastroenterologist and is working on setting up a sigmoidoscopy  Patient has never had a colonoscopy or sigmoidoscopy in the past   He denies recent travel, antibiotic use  History provided by:  Patient   used: No    Abdominal Pain   Pain location:  Generalized  Pain quality: cramping    Pain radiates to:  Does not radiate  Pain severity:  Unable to specify  Onset quality:  Gradual  Duration:  3 days  Timing:  Intermittent  Progression:  Waxing and waning  Chronicity:  Recurrent  Context: not alcohol use, not diet changes, not eating, not previous surgeries, not recent illness, not recent sexual activity, not recent travel, not sick contacts, not suspicious food intake and not trauma    Relieved by:  Nothing  Worsened by:  Nothing  Ineffective treatments: Imodium  Associated symptoms: diarrhea    Associated symptoms: no anorexia, no belching, no chest pain, no chills, no constipation, no cough, no dysuria, no fatigue, no fever, no flatus, no hematemesis, no hematochezia, no hematuria, no melena, no nausea and no vomiting    Risk factors: no alcohol abuse, has not had multiple surgeries, no NSAID use and not obese        Prior to Admission Medications   Prescriptions Last Dose Informant Patient Reported?  Taking?   dicyclomine (BENTYL) 10 mg capsule   No Yes   Sig: Take 1 capsule (10 mg total) by mouth 3 (three) times a day before meals   loperamide (IMODIUM A-D) 2 MG tablet   Yes Yes   Sig: Take 2 mg by mouth 4 (four) times a day   ondansetron (ZOFRAN) 4 mg tablet   No Yes   Sig: Take 1 tablet (4 mg total) by mouth every 8 (eight) hours as needed for nausea or vomiting   psyllium (METAMUCIL) 58 6 % packet   Yes Yes   Sig: Take 1 packet by mouth daily      Facility-Administered Medications: None       Past Medical History:   Diagnosis Date    Anxiety     Irritable bowel disease     No known problems        Past Surgical History:   Procedure Laterality Date    MOUTH SURGERY      extractions    TONSILLECTOMY         Family History   Problem Relation Age of Onset    Hypertension Mother     Heart attack Mother     No Known Problems Father      I have reviewed and agree with the history as documented  Social History   Substance Use Topics    Smoking status: Current Every Day Smoker     Packs/day: 0 25     Types: Cigarettes    Smokeless tobacco: Never Used    Alcohol use Yes      Comment: occassionally        Review of Systems   Constitutional: Negative  Negative for chills, fatigue and fever  HENT: Negative  Eyes: Negative  Respiratory: Negative  Negative for cough  Cardiovascular: Negative  Negative for chest pain  Gastrointestinal: Positive for abdominal pain and diarrhea  Negative for abdominal distention, anorexia, blood in stool, constipation, flatus, hematemesis, hematochezia, melena, nausea and vomiting  Genitourinary: Negative  Negative for dysuria and hematuria  Musculoskeletal: Negative for neck pain  Skin: Negative  Allergic/Immunologic: Negative  Neurological: Negative  Negative for weakness, numbness and headaches  Hematological: Negative  Psychiatric/Behavioral: Negative  All other systems reviewed and are negative  Physical Exam  Physical Exam   Constitutional: He is oriented to person, place, and time  He appears well-developed and well-nourished    Non-toxic appearance  He does not have a sickly appearance  He does not appear ill  No distress  HENT:   Head: Normocephalic and atraumatic  Right Ear: External ear normal    Left Ear: External ear normal    Mouth/Throat: Oropharynx is clear and moist    Eyes: Pupils are equal, round, and reactive to light  Conjunctivae are normal  No scleral icterus  Cardiovascular: Normal rate, regular rhythm and normal heart sounds  Pulmonary/Chest: Effort normal and breath sounds normal    Abdominal: Soft  Normal appearance and bowel sounds are normal  He exhibits no distension and no mass  There is generalized tenderness  There is no rebound and no guarding  No hernia  Neurological: He is alert and oriented to person, place, and time  He has normal strength and normal reflexes  He exhibits normal muscle tone  Skin: Skin is warm and dry  No rash noted  He is not diaphoretic  No erythema  No pallor  Psychiatric: He has a normal mood and affect  Nursing note and vitals reviewed        Vital Signs  ED Triage Vitals   Temperature Pulse Respirations Blood Pressure SpO2   02/09/19 1248 02/09/19 1248 02/09/19 1248 02/09/19 1248 02/09/19 1248   98 °F (36 7 °C) 83 18 134/77 97 %      Temp Source Heart Rate Source Patient Position - Orthostatic VS BP Location FiO2 (%)   02/09/19 1248 02/09/19 1248 02/09/19 1248 02/09/19 1248 --   Oral Monitor Sitting Right arm       Pain Score       02/09/19 1340       No Pain           Vitals:    02/09/19 1248 02/09/19 1437   BP: 134/77 115/68   Pulse: 83 64   Patient Position - Orthostatic VS: Sitting Lying       Visual Acuity      ED Medications  Medications   sodium chloride 0 9 % bolus 1,000 mL (1,000 mL Intravenous New Bag 2/9/19 1346)   ketorolac (TORADOL) injection 30 mg (30 mg Intravenous Given 2/9/19 1345)       Diagnostic Studies  Results Reviewed     Procedure Component Value Units Date/Time    Basic metabolic panel [[id number]] Collected:  02/09/19 1344    Lab Status:  Final result Specimen:  Blood from Arm, Right Updated:  02/09/19 1407     Sodium 140 mmol/L      Potassium 3 9 mmol/L      Chloride 104 mmol/L      CO2 26 mmol/L      ANION GAP 10 mmol/L      BUN 9 mg/dL      Creatinine 0 73 mg/dL      Glucose 94 mg/dL      Calcium 8 8 mg/dL      eGFR 118 ml/min/1 73sq m     Narrative:         National Kidney Disease Education Program recommendations are as follows:  GFR calculation is accurate only with a steady state creatinine  Chronic Kidney disease less than 60 ml/min/1 73 sq  meters  Kidney failure less than 15 ml/min/1 73 sq  meters      Lipase [[id number]]  (Normal) Collected:  02/09/19 1344    Lab Status:  Final result Specimen:  Blood from Arm, Right Updated:  02/09/19 1407     Lipase 85 u/L     UA w Reflex to Microscopic [[id number]] Collected:  02/09/19 1339    Lab Status:  Final result Specimen:  Urine from Urine, Clean Catch Updated:  02/09/19 1404     Color, UA Yellow     Clarity, UA Clear     Specific Gravity, UA 1 010     pH, UA 7 5     Leukocytes, UA Negative     Nitrite, UA Negative     Protein, UA Negative mg/dl      Glucose, UA Negative mg/dl      Ketones, UA Negative mg/dl      Urobilinogen, UA 0 2 E U /dl      Bilirubin, UA Negative     Blood, UA Negative    CBC and differential [[id number]]  (Abnormal) Collected:  02/09/19 1344    Lab Status:  Final result Specimen:  Blood from Arm, Right Updated:  02/09/19 1351     WBC 9 78 Thousand/uL      RBC 4 38 Million/uL      Hemoglobin 13 5 g/dL      Hematocrit 39 9 %      MCV 91 fL      MCH 30 8 pg      MCHC 33 8 g/dL      RDW 12 7 %      MPV 8 8 (L) fL      Platelets 062 Thousands/uL      nRBC 0 /100 WBCs      Neutrophils Relative 77 (H) %      Immat GRANS % 0 %      Lymphocytes Relative 15 %      Monocytes Relative 6 %      Eosinophils Relative 1 %      Basophils Relative 1 %      Neutrophils Absolute 7 59 Thousands/µL      Immature Grans Absolute 0 03 Thousand/uL      Lymphocytes Absolute 1 49 Thousands/µL      Monocytes Absolute 0 56 Thousand/µL      Eosinophils Absolute 0 05 Thousand/µL      Basophils Absolute 0 06 Thousands/µL                  No orders to display              Procedures  Procedures       Phone Contacts  ED Phone Contact    ED Course                               MDM  Number of Diagnoses or Management Options  Diagnosis management comments: 81-year-old male presents with multiple episodes of diarrhea over the last 2 to 3 days  His vomiting which she was seen for 3 days ago has resolved  On exam he appears well in no distress  He has mild diffuse tenderness without peritoneal signs  Will check CBC, CMP, lipase rule out electrolyte abnormality  Patient is working on being scheduled for sigmoidoscopy for his frequent episodes of diarrhea  Amount and/or Complexity of Data Reviewed  Clinical lab tests: ordered and reviewed        Disposition  Final diagnoses:   Diarrhea   Abdominal pain     Time reflects when diagnosis was documented in both MDM as applicable and the Disposition within this note     Time User Action Codes Description Comment    2/9/2019  2:52 PM John Poag A Add [R19 7] Diarrhea     2/9/2019  2:53 PM John Poag A Add [R10 9] Abdominal pain       ED Disposition     ED Disposition Condition Date/Time Comment    Discharge  Sat Feb 9, 2019  2:53 PM Antoni Smith discharge to home/self care  Condition at discharge: Good        Follow-up Information     Follow up With Specialties Details Why Contact Info       Follow-up with your gastroenterologist this week           Patient's Medications   Discharge Prescriptions    DIPHENOXYLATE-ATROPINE (LOMOTIL) 2 5-0 025 MG PER TABLET    Take 1 tablet by mouth 4 (four) times a day as needed for diarrhea for up to 10 days       Start Date: 2/9/2019  End Date: 2/19/2019       Order Dose: 1 tablet       Quantity: 10 tablet    Refills: 0     No discharge procedures on file      ED Provider  Electronically Signed by           Dilip Griggs DO  02/09/19 1455

## 2019-02-09 NOTE — DISCHARGE INSTRUCTIONS
Abdominal Pain   WHAT YOU NEED TO KNOW:   Abdominal pain can be dull, achy, or sharp  You may have pain in one area of your abdomen, or in your entire abdomen  Your pain may be caused by a condition such as constipation, food sensitivity or poisoning, infection, or a blockage  Abdominal pain can also be from a hernia, appendicitis, or an ulcer  Liver, gallbladder, or kidney conditions can also cause abdominal pain  The cause of your abdominal pain may be unknown  DISCHARGE INSTRUCTIONS:   Return to the emergency department if:   · You have new chest pain or shortness of breath  · You have pulsing pain in your upper abdomen or lower back that suddenly becomes constant  · Your pain is in the right lower abdominal area and worsens with movement  · You have a fever over 100 4°F (38°C) or shaking chills  · You are vomiting and cannot keep food or liquids down  · Your pain does not improve or gets worse over the next 8 to 12 hours  · You see blood in your vomit or bowel movements, or they look black and tarry  · Your skin or the whites of your eyes turn yellow  · You are a woman and have a large amount of vaginal bleeding that is not your monthly period  Contact your healthcare provider if:   · You have pain in your lower back  · You are a man and have pain in your testicles  · You have pain when you urinate  · You have questions or concerns about your condition or care  Follow up with your healthcare provider within 24 hours or as directed:  Write down your questions so you remember to ask them during your visits  Medicines:   · Medicines  may be given to calm your stomach and prevent vomiting or to decrease pain  Ask how to take pain medicine safely  · Take your medicine as directed  Contact your healthcare provider if you think your medicine is not helping or if you have side effects  Tell him of her if you are allergic to any medicine   Keep a list of the medicines, vitamins, and herbs you take  Include the amounts, and when and why you take them  Bring the list or the pill bottles to follow-up visits  Carry your medicine list with you in case of an emergency  © 2017 [employer] Information is for End User's use only and may not be sold, redistributed or otherwise used for commercial purposes  All illustrations and images included in CareNotes® are the copyrighted property of [employer]  or [employer]  The above information is an  only  It is not intended as medical advice for individual conditions or treatments  Talk to your doctor, nurse or pharmacist before following any medical regimen to see if it is safe and effective for you  Acute Diarrhea   WHAT YOU NEED TO KNOW:   Acute diarrhea starts quickly and lasts a short time, usually 1 to 3 days  It can last up to 2 weeks  You may not be able to control your diarrhea  Acute diarrhea usually stops on its own  DISCHARGE INSTRUCTIONS:   Return to the emergency department if:   · You feel confused  · Your heartbeat is faster than normal      · Your eyes look deeply sunken, or you have no tears when you cry  · You urinate less than usual, or your urine is dark yellow  · You have blood or mucus in your stools  · You have severe abdominal pain  · You are unable to drink any liquids  Contact your healthcare provider if:   · Your symptoms do not get better with treatment  · You have a fever higher than 101 3°F (38 5°C)  · You have trouble eating and drinking because you are vomiting  · You are thirsty or have a dry mouth  · Your diarrhea does not get better in 7 days  · You have questions or concerns about your condition or care  Follow up with your healthcare provider as directed:  Write down your questions so you remember to ask them during your visits     Medicines:  · Diarrhea medicine  is an over-the-counter medicine that helps slow or stop your diarrhea  If you take other medicines, talk to your healthcare provider before you take diarrhea medicine  · Antibiotics  may be given to help treat an infection caused by bacteria  · Antiparasitics  may be given to treat an infection caused by parasites  · Take your medicine as directed  Contact your healthcare provider if you think your medicine is not helping or if you have side effects  Tell him of her if you are allergic to any medicine  Keep a list of the medicines, vitamins, and herbs you take  Include the amounts, and when and why you take them  Bring the list or the pill bottles to follow-up visits  Carry your medicine list with you in case of an emergency  Self-care:   · Drink liquids as directed  Liquids will help prevent dehydration caused by diarrhea  Ask your healthcare provider how much liquid to drink each day and which liquids are best for you  You may need to drink an oral rehydration solution (ORS)  An ORS has the right amounts of water, salts, and sugar you need to replace body fluids  You can buy an ORS at most grocery stores and pharmacies  · Eat foods that are easy to digest   Examples include rice, lentils, cereal, bananas, potatoes, and bread  It also includes some fruits (bananas, melon), well-cooked vegetables, and lean meats  Avoid foods high in fiber, fat, and sugar  Also avoid caffeine, alcohol, dairy, and red meat until your diarrhea is gone  Prevent acute diarrhea:   · Wash your hands often  Use soap and water  Wash your hands before you eat or prepare food  Also wash your hands after you use the bathroom  Use an alcohol-based hand gel when soap and water are not available  · Keep bathroom surfaces clean  This helps prevent the spread of germs that cause acute diarrhea  · Wash fruits and vegetables well before you eat them  This can help remove germs that cause diarrhea   If possible, remove the skin from fruits and vegetables, or cook them well before you eat them  · Cook meat as directed  ¨ Cook ground meat  to 160°F      ¨ Cook ground poultry, whole poultry, or cuts of poultry  to at least 165°F  Remove the meat from heat  Let it stand for 3 minutes before you eat it  ¨ Cook whole cuts of meat other than poultry  to at least 145°F  Remove the meat from heat  Let it stand for 3 minutes before you eat it  · Wash dishes that have touched raw meat with hot water and soap  This includes cutting boards, utensils, dishes, and serving containers  · Place raw or cooked meat in the refrigerator as soon as possible  Bacteria can grow in meat that is left at room temperature too long  · Do not eat raw or undercooked oysters, clams, or mussels  These foods may be contaminated and cause infection  · Drink filtered or treated water only when you travel  Do not put ice in your drinks  Drink bottled water whenever possible  © 2017 [employer] Information is for End User's use only and may not be sold, redistributed or otherwise used for commercial purposes  All illustrations and images included in CareNotes® are the copyrighted property of [employer]  or [employer]  The above information is an  only  It is not intended as medical advice for individual conditions or treatments  Talk to your doctor, nurse or pharmacist before following any medical regimen to see if it is safe and effective for you

## 2019-02-11 ENCOUNTER — HOSPITAL ENCOUNTER (EMERGENCY)
Facility: HOSPITAL | Age: 39
Discharge: HOME | End: 2019-02-11
Attending: EMERGENCY MEDICINE | Admitting: EMERGENCY MEDICINE
Payer: COMMERCIAL

## 2019-02-11 VITALS
RESPIRATION RATE: 16 BRPM | OXYGEN SATURATION: 97 % | TEMPERATURE: 98.5 F | SYSTOLIC BLOOD PRESSURE: 150 MMHG | HEART RATE: 69 BPM | DIASTOLIC BLOOD PRESSURE: 71 MMHG

## 2019-02-11 DIAGNOSIS — R42 DIZZINESS: Primary | ICD-10-CM

## 2019-02-11 PROCEDURE — 99283 EMERGENCY DEPT VISIT LOW MDM: CPT

## 2019-02-11 RX ORDER — MECLIZINE HYDROCHLORIDE 25 MG/1
25 TABLET ORAL 3 TIMES DAILY PRN
Qty: 15 TABLET | Refills: 0 | Status: SHIPPED | OUTPATIENT
Start: 2019-02-11

## 2019-02-11 NOTE — ED PROVIDER NOTES
History  Chief Complaint   Patient presents with    Dizziness     Patient states he woke up this morning and was sweating  also complains of eye drainage and states "I kind of feel off balance " Patient c/o dizziness  41-year-old male with a history of irritable bowel syndrome, anxiety presents to the emergency department with feeling sweaty and off balance when he woke up this morning  He had an episode of nausea and he took 1 Zofran  This did help  Patient was seen here twice last week for diarrhea  He had blood work which was within normal limits  He states he has been taking Lomotil with relief of the diarrhea  He denies fevers or chills  No abdominal pain  No URI symptoms or ear complaints  No headache  History provided by:  Patient  Dizziness   Quality:  Imbalance  Severity:  Unable to specify  Onset quality:  Unable to specify (Woke with symptoms this morning)  Duration:  3 hours  Timing:  Constant  Progression:  Unchanged  Chronicity:  New  Context: inactivity    Context: not with ear pain    Relieved by:  None tried  Worsened by:  Nothing  Ineffective treatments:  None tried  Associated symptoms: diarrhea (Lessening in severity) and nausea    Associated symptoms: no blood in stool, no chest pain, no headaches, no hearing loss, no palpitations, no shortness of breath, no syncope, no tinnitus, no vision changes, no vomiting and no weakness    Risk factors: new medications    Risk factors: no heart disease, no hx of vertigo, no Meniere's disease and no multiple medications        Prior to Admission Medications   Prescriptions Last Dose Informant Patient Reported?  Taking?   dicyclomine (BENTYL) 10 mg capsule   No No   Sig: Take 1 capsule (10 mg total) by mouth 3 (three) times a day before meals   diphenoxylate-atropine (LOMOTIL) 2 5-0 025 mg per tablet   No No   Sig: Take 1 tablet by mouth 4 (four) times a day as needed for diarrhea for up to 10 days   ondansetron (ZOFRAN) 4 mg tablet   No No   Sig: Take 1 tablet (4 mg total) by mouth every 8 (eight) hours as needed for nausea or vomiting   psyllium (METAMUCIL) 58 6 % packet   Yes No   Sig: Take 1 packet by mouth daily      Facility-Administered Medications: None       Past Medical History:   Diagnosis Date    Anxiety     Irritable bowel disease     No known problems        Past Surgical History:   Procedure Laterality Date    MOUTH SURGERY      extractions    TONSILLECTOMY         Family History   Problem Relation Age of Onset    Hypertension Mother     Heart attack Mother     No Known Problems Father      I have reviewed and agree with the history as documented  Social History     Tobacco Use    Smoking status: Current Every Day Smoker     Packs/day: 0 25     Types: Cigarettes    Smokeless tobacco: Never Used   Substance Use Topics    Alcohol use: Yes     Comment: occassionally    Drug use: No        Review of Systems   Constitutional: Positive for diaphoresis  HENT: Negative  Negative for hearing loss and tinnitus  Eyes: Negative  Respiratory: Negative  Negative for shortness of breath  Cardiovascular: Negative  Negative for chest pain, palpitations and syncope  Gastrointestinal: Positive for diarrhea (Lessening in severity) and nausea  Negative for blood in stool and vomiting  Genitourinary: Negative  Musculoskeletal: Negative for neck pain  Skin: Negative  Allergic/Immunologic: Negative  Neurological: Positive for dizziness  Negative for tremors, seizures, syncope, facial asymmetry, speech difficulty, weakness, light-headedness, numbness and headaches  Hematological: Negative  Psychiatric/Behavioral: Negative  All other systems reviewed and are negative  Physical Exam  Physical Exam   Constitutional: He is oriented to person, place, and time  He appears well-developed and well-nourished  Non-toxic appearance  He does not have a sickly appearance  He does not appear ill  No distress  HENT:   Head: Normocephalic and atraumatic  Right Ear: External ear normal    Left Ear: External ear normal    Mouth/Throat: Oropharynx is clear and moist    Eyes: Pupils are equal, round, and reactive to light  Conjunctivae and EOM are normal  Right eye exhibits no discharge  Left eye exhibits no discharge  No scleral icterus  Neck: Normal range of motion  Neck supple  Cardiovascular: Normal rate, regular rhythm and normal heart sounds  Pulmonary/Chest: Effort normal and breath sounds normal    Abdominal: Soft  Bowel sounds are normal  He exhibits no distension and no mass  There is no tenderness  No hernia  Musculoskeletal: Normal range of motion  He exhibits no edema, tenderness or deformity  Lymphadenopathy:     He has no cervical adenopathy  Neurological: He is alert and oriented to person, place, and time  He has normal strength and normal reflexes  He displays normal reflexes  No cranial nerve deficit  He exhibits normal muscle tone  Coordination normal    Normal ambulation   Skin: Skin is warm and dry  No rash noted  He is not diaphoretic  No erythema  No pallor  Psychiatric: He has a normal mood and affect  Nursing note and vitals reviewed        Vital Signs  ED Triage Vitals [02/11/19 0951]   Temperature Pulse Respirations Blood Pressure SpO2   98 5 °F (36 9 °C) 69 16 150/71 97 %      Temp Source Heart Rate Source Patient Position - Orthostatic VS BP Location FiO2 (%)   Oral Monitor Sitting Left arm --      Pain Score       No Pain           Vitals:    02/11/19 0951   BP: 150/71   Pulse: 69   Patient Position - Orthostatic VS: Sitting       Visual Acuity      ED Medications  Medications - No data to display    Diagnostic Studies  Results Reviewed     None                 No orders to display              Procedures  Procedures       Phone Contacts  ED Phone Contact    ED Course                               MDM  Number of Diagnoses or Management Options  Diagnosis management comments: 60-year-old male presents with feeling off balance and sweaty  This started when he woke up this morning  No other symptoms  He was recently seen here last week twice for diarrhea  He had blood work on February 9th which was normal   His exam here is normal   This is most likely related to a viral illness  Will prescribe Antivert for patient to take as needed for dizziness  Disposition  Final diagnoses:   None     ED Disposition     None      Follow-up Information    None         Patient's Medications   Discharge Prescriptions    No medications on file     No discharge procedures on file      ED Provider  Electronically Signed by           Lance Estrada DO  02/11/19 0327

## 2019-02-14 ENCOUNTER — PATIENT OUTREACH (OUTPATIENT)
Dept: FAMILY MEDICINE CLINIC | Facility: CLINIC | Age: 39
End: 2019-02-14

## 2019-02-15 ENCOUNTER — TELEPHONE (OUTPATIENT)
Dept: FAMILY MEDICINE CLINIC | Facility: CLINIC | Age: 39
End: 2019-02-15

## 2019-02-15 NOTE — TELEPHONE ENCOUNTER
Daisy Fenton called today and wanted to speak to someone regarding what can/cant be shared with his employer concerning his medical problems  He says that he has been missing work due to some symptoms but does give them a note every time he calls off  He just has some general questions for you  He said he left a voicemail for you explaining some of this  Anyway, just wanted to let you know that he spoke with me personally about this

## 2019-02-15 NOTE — PROGRESS NOTES
SW Care Manager spoke with PT  PT states he was having issues with coordinating services with GI since he is in need of a colonoscopy but he owes this office money  The PT states he was able to work out a payment plan with the GI office and is now able to have procedure done  PT states no other issues at this time and will contact EMIGDIO if the need arises  EMIGDIO provided contact information for follow up needs

## 2019-02-20 ENCOUNTER — PATIENT OUTREACH (OUTPATIENT)
Dept: FAMILY MEDICINE CLINIC | Facility: CLINIC | Age: 39
End: 2019-02-20

## 2019-02-21 NOTE — PROGRESS NOTES
EMIGDIO Care Manager return called to PT regarding care needs  PT states he has been attempting to go to work regularly but that his IBS has been preventing this  He is unsure of how this will affect his employment but will continue to attempt to work and attend his appointments  EMIGDIO suggested PT continue to follow up with GI in regards to his IBS  EMIGDIO provided contact information for follow up needs

## 2019-03-10 ENCOUNTER — TELEPHONE (OUTPATIENT)
Dept: OTHER | Facility: OTHER | Age: 39
End: 2019-03-10

## 2019-03-20 ENCOUNTER — PATIENT OUTREACH (OUTPATIENT)
Dept: FAMILY MEDICINE CLINIC | Facility: CLINIC | Age: 39
End: 2019-03-20

## 2019-03-20 NOTE — PROGRESS NOTES
EMIGDIO Care Manager contacted pt as a return call in regards to his care needs  Pt states he has been having ongoing issues with his employer due to missing work  Pt states that today he received a final warning-written notice- that he cannot miss more days  Pt states on days that he has been sick he offered each time to provide a sick note but most of the time the company refused to take it  Pt states he has discussed with HR and following the discussion with his supervisor most recently, he feels that they are not handling his medical information properly  EMIGDIO discussed the pt contacting [employer] [place] in regards to the legal ramifications of this , if any  EMIGDIO will discuss care needs with the pt's PCP and suggested the pt may want to discuss further with his GI specialist in regards to any leave that may be needed to take, etc  EMIGDIO will speak with PCP and contact pt in regards to these needs  EMIGDIO provided contact information for return call

## 2019-03-25 ENCOUNTER — PATIENT OUTREACH (OUTPATIENT)
Dept: FAMILY MEDICINE CLINIC | Facility: CLINIC | Age: 39
End: 2019-03-25

## 2020-02-18 ENCOUNTER — TELEPHONE (OUTPATIENT)
Dept: FAMILY MEDICINE CLINIC | Facility: CLINIC | Age: 40
End: 2020-02-18

## 2020-02-18 NOTE — TELEPHONE ENCOUNTER
Pt called office because he was in [employer] today  He was told he may scabies  He said his job gave him new insurance and he called them to verify if we were in network and they told him we are not  I advised pt that if he wanted to be seen here, he can pay $128 up front  Pt agreed to come in and speak with financial counselors to see if they can help him because he is only familiar with Stephany Valencia and wants to continue his care with him

## 2021-10-05 NOTE — TELEPHONE ENCOUNTER
Future lab order placed. Tori Rizvi MD    Pt will call us back or call for a same date appt on 3/14/19